# Patient Record
Sex: FEMALE | Race: WHITE | HISPANIC OR LATINO | Employment: UNEMPLOYED | URBAN - METROPOLITAN AREA
[De-identification: names, ages, dates, MRNs, and addresses within clinical notes are randomized per-mention and may not be internally consistent; named-entity substitution may affect disease eponyms.]

---

## 2019-04-05 ENCOUNTER — NON-PROVIDER VISIT (OUTPATIENT)
Dept: OBGYN | Facility: CLINIC | Age: 34
End: 2019-04-05
Payer: MEDICAID

## 2019-04-05 DIAGNOSIS — Z32.01 POSITIVE URINE PREGNANCY TEST: ICD-10-CM

## 2019-04-05 LAB
INT CON NEG: NEGATIVE
INT CON POS: POSITIVE
POC URINE PREGNANCY TEST: POSITIVE

## 2019-04-05 PROCEDURE — 81025 URINE PREGNANCY TEST: CPT | Performed by: OBSTETRICS & GYNECOLOGY

## 2019-04-18 ENCOUNTER — HOSPITAL ENCOUNTER (OUTPATIENT)
Dept: LAB | Facility: MEDICAL CENTER | Age: 34
End: 2019-04-18
Attending: SPECIALIST
Payer: MEDICAID

## 2019-04-19 LAB
FORWARD REASON: SPWHY: NORMAL
FORWARDED TO LAB: SPWHR: NORMAL
SPECIMEN SENT: SPWT1: NORMAL
SPECIMEN SOURCE: NORMAL
SPECIMEN SOURCE: NORMAL

## 2019-10-31 NOTE — H&P
DATE OF ADMISSION:  2019    REASON FOR ADMISSION:  Prodromal labor.    HISTORY OF PRESENT ILLNESS:  This is a 34-year-old  4, para 3 at 39   weeks gestation based on last menstrual period consistent with a 9-week   ultrasound, who now wished to proceed forward with an augmentation of labor   with Pitocin per protocol.  She is 2, 60%, -2 station, soft, anterior.  She   has a history of rapid deliveries ranging 4-5 hours.  She is group B strep   positive.  Given her prodromal labor symptoms, she is wishing to proceed   forward with an augmentation, so that she may receive at least 2 doses of   antibiotics prior to delivery and is comfortable proceeding forward  given her   favorable cervix and favorable Doty score.    PAST MEDICAL HISTORY:  Occasional migraine headaches.    PAST SURGICAL HISTORY:  None.    OBSTETRICAL HISTORY:  The patient has had 3 previous deliveries delivering   somewhere in the neighborhood of 4-5 hours with each delivery between 38 and   41 weeks' gestation.  This is her fourth pregnancy.    SOCIAL HISTORY:  She denies use of any alcohol, tobacco, or recreational drug   use.    MEDICATIONS:  Prenatal vitamins.    ALLERGIES:  No known drug allergies.    PHYSICAL EXAMINATION:  VITAL SIGNS:  She is afebrile, hemodynamically stable.  HEART:  Regular rate and rhythm.  CHEST:  Clear to auscultation bilaterally.  ABDOMEN:  Soft, gravid, nontender.  PELVIC:  Sterile vaginal exam is as stated above.  EXTREMITIES:  Nontender.    LABORATORY DATA:  Prenatal care labs are all in order.  She is group B strep   positive.    ASSESSMENT AND PLAN:  A 34-year-old  4, para 3 at 39 weeks gestation by   excellent dates with prodromal labor symptoms, favorable cervix, favorable   Doty score, history of fast labors, group B strep positive wishing to   attempt to try to receive 2 doses of antibiotics prior to delivery, now   wishing to proceed forward with Pitocin augmentation of labor.              ____________________________________     MD GERALD BEVERLY    DD:  10/31/2019 13:40:54  DT:  10/31/2019 14:07:43    D#:  [id number]  Job#:  [id number]

## 2019-11-13 ENCOUNTER — HOSPITAL ENCOUNTER (INPATIENT)
Facility: MEDICAL CENTER | Age: 34
LOS: 2 days | End: 2019-11-15
Attending: SPECIALIST | Admitting: SPECIALIST
Payer: MEDICAID

## 2019-11-13 ENCOUNTER — APPOINTMENT (OUTPATIENT)
Dept: OBGYN | Facility: MEDICAL CENTER | Age: 34
End: 2019-11-13
Attending: SPECIALIST
Payer: MEDICAID

## 2019-11-13 DIAGNOSIS — R10.2 FEMALE PELVIC PAIN: ICD-10-CM

## 2019-11-13 LAB
BASOPHILS # BLD AUTO: 0.4 % (ref 0–1.8)
BASOPHILS # BLD: 0.04 K/UL (ref 0–0.12)
EOSINOPHIL # BLD AUTO: 0.15 K/UL (ref 0–0.51)
EOSINOPHIL NFR BLD: 1.4 % (ref 0–6.9)
ERYTHROCYTE [DISTWIDTH] IN BLOOD BY AUTOMATED COUNT: 42.6 FL (ref 35.9–50)
HCT VFR BLD AUTO: 38.6 % (ref 37–47)
HGB BLD-MCNC: 12.7 G/DL (ref 12–16)
HOLDING TUBE BB 8507: NORMAL
IMM GRANULOCYTES # BLD AUTO: 0.03 K/UL (ref 0–0.11)
IMM GRANULOCYTES NFR BLD AUTO: 0.3 % (ref 0–0.9)
LYMPHOCYTES # BLD AUTO: 3.34 K/UL (ref 1–4.8)
LYMPHOCYTES NFR BLD: 31.7 % (ref 22–41)
MCH RBC QN AUTO: 29 PG (ref 27–33)
MCHC RBC AUTO-ENTMCNC: 32.9 G/DL (ref 33.6–35)
MCV RBC AUTO: 88.1 FL (ref 81.4–97.8)
MONOCYTES # BLD AUTO: 0.63 K/UL (ref 0–0.85)
MONOCYTES NFR BLD AUTO: 6 % (ref 0–13.4)
NEUTROPHILS # BLD AUTO: 6.34 K/UL (ref 2–7.15)
NEUTROPHILS NFR BLD: 60.2 % (ref 44–72)
NRBC # BLD AUTO: 0 K/UL
NRBC BLD-RTO: 0 /100 WBC
PLATELET # BLD AUTO: 233 K/UL (ref 164–446)
PMV BLD AUTO: 9.8 FL (ref 9–12.9)
RBC # BLD AUTO: 4.38 M/UL (ref 4.2–5.4)
WBC # BLD AUTO: 10.5 K/UL (ref 4.8–10.8)

## 2019-11-13 PROCEDURE — 770002 HCHG ROOM/CARE - OB PRIVATE (112)

## 2019-11-13 PROCEDURE — 304965 HCHG RECOVERY SERVICES

## 2019-11-13 PROCEDURE — 59409 OBSTETRICAL CARE: CPT

## 2019-11-13 PROCEDURE — 700111 HCHG RX REV CODE 636 W/ 250 OVERRIDE (IP)

## 2019-11-13 PROCEDURE — 85025 COMPLETE CBC W/AUTO DIFF WBC: CPT

## 2019-11-13 PROCEDURE — 10907ZC DRAINAGE OF AMNIOTIC FLUID, THERAPEUTIC FROM PRODUCTS OF CONCEPTION, VIA NATURAL OR ARTIFICIAL OPENING: ICD-10-PCS | Performed by: SPECIALIST

## 2019-11-13 PROCEDURE — 700105 HCHG RX REV CODE 258

## 2019-11-13 PROCEDURE — 700102 HCHG RX REV CODE 250 W/ 637 OVERRIDE(OP): Performed by: SPECIALIST

## 2019-11-13 PROCEDURE — 36415 COLL VENOUS BLD VENIPUNCTURE: CPT

## 2019-11-13 PROCEDURE — 700105 HCHG RX REV CODE 258: Performed by: SPECIALIST

## 2019-11-13 PROCEDURE — A9270 NON-COVERED ITEM OR SERVICE: HCPCS | Performed by: SPECIALIST

## 2019-11-13 PROCEDURE — 10H07YZ INSERTION OF OTHER DEVICE INTO PRODUCTS OF CONCEPTION, VIA NATURAL OR ARTIFICIAL OPENING: ICD-10-PCS | Performed by: SPECIALIST

## 2019-11-13 PROCEDURE — 700111 HCHG RX REV CODE 636 W/ 250 OVERRIDE (IP): Performed by: SPECIALIST

## 2019-11-13 RX ORDER — DOCUSATE SODIUM 100 MG/1
100 CAPSULE, LIQUID FILLED ORAL 2 TIMES DAILY PRN
Status: DISCONTINUED | OUTPATIENT
Start: 2019-11-13 | End: 2019-11-15 | Stop reason: HOSPADM

## 2019-11-13 RX ORDER — HYDROCODONE BITARTRATE AND ACETAMINOPHEN 5; 325 MG/1; MG/1
1 TABLET ORAL EVERY 4 HOURS PRN
Status: DISCONTINUED | OUTPATIENT
Start: 2019-11-13 | End: 2019-11-15 | Stop reason: HOSPADM

## 2019-11-13 RX ORDER — VITAMIN A ACETATE, BETA CAROTENE, ASCORBIC ACID, CHOLECALCIFEROL, .ALPHA.-TOCOPHEROL ACETATE, DL-, THIAMINE MONONITRATE, RIBOFLAVIN, NIACINAMIDE, PYRIDOXINE HYDROCHLORIDE, FOLIC ACID, CYANOCOBALAMIN, CALCIUM CARBONATE, FERROUS FUMARATE, ZINC OXIDE, CUPRIC OXIDE 3080; 12; 120; 400; 1; 1.84; 3; 20; 22; 920; 25; 200; 27; 10; 2 [IU]/1; UG/1; MG/1; [IU]/1; MG/1; MG/1; MG/1; MG/1; MG/1; [IU]/1; MG/1; MG/1; MG/1; MG/1; MG/1
1 TABLET, FILM COATED ORAL EVERY MORNING
Status: DISCONTINUED | OUTPATIENT
Start: 2019-11-14 | End: 2019-11-15 | Stop reason: HOSPADM

## 2019-11-13 RX ORDER — ONDANSETRON 2 MG/ML
4 INJECTION INTRAMUSCULAR; INTRAVENOUS EVERY 6 HOURS PRN
Status: DISCONTINUED | OUTPATIENT
Start: 2019-11-13 | End: 2019-11-15 | Stop reason: HOSPADM

## 2019-11-13 RX ORDER — BISACODYL 10 MG
10 SUPPOSITORY, RECTAL RECTAL PRN
Status: DISCONTINUED | OUTPATIENT
Start: 2019-11-13 | End: 2019-11-15 | Stop reason: HOSPADM

## 2019-11-13 RX ORDER — TERBUTALINE SULFATE 1 MG/ML
0.25 INJECTION, SOLUTION SUBCUTANEOUS PRN
Status: DISCONTINUED | OUTPATIENT
Start: 2019-11-13 | End: 2019-11-13

## 2019-11-13 RX ORDER — SODIUM CHLORIDE 9 MG/ML
INJECTION, SOLUTION INTRAVENOUS
Status: ACTIVE
Start: 2019-11-13 | End: 2019-11-13

## 2019-11-13 RX ORDER — MISOPROSTOL 200 UG/1
600 TABLET ORAL
Status: DISCONTINUED | OUTPATIENT
Start: 2019-11-13 | End: 2019-11-15 | Stop reason: HOSPADM

## 2019-11-13 RX ORDER — SODIUM CHLORIDE, SODIUM LACTATE, POTASSIUM CHLORIDE, CALCIUM CHLORIDE 600; 310; 30; 20 MG/100ML; MG/100ML; MG/100ML; MG/100ML
1000 INJECTION, SOLUTION INTRAVENOUS CONTINUOUS
Status: DISCONTINUED | OUTPATIENT
Start: 2019-11-13 | End: 2019-11-13

## 2019-11-13 RX ORDER — IBUPROFEN 600 MG/1
600 TABLET ORAL EVERY 6 HOURS PRN
Status: DISCONTINUED | OUTPATIENT
Start: 2019-11-13 | End: 2019-11-15 | Stop reason: HOSPADM

## 2019-11-13 RX ORDER — ONDANSETRON 4 MG/1
4 TABLET, ORALLY DISINTEGRATING ORAL EVERY 6 HOURS PRN
Status: DISCONTINUED | OUTPATIENT
Start: 2019-11-13 | End: 2019-11-15 | Stop reason: HOSPADM

## 2019-11-13 RX ORDER — CITRIC ACID/SODIUM CITRATE 334-500MG
30 SOLUTION, ORAL ORAL EVERY 6 HOURS PRN
Status: DISCONTINUED | OUTPATIENT
Start: 2019-11-13 | End: 2019-11-13

## 2019-11-13 RX ORDER — ACETAMINOPHEN 325 MG/1
325 TABLET ORAL EVERY 4 HOURS PRN
Status: DISCONTINUED | OUTPATIENT
Start: 2019-11-13 | End: 2019-11-15 | Stop reason: HOSPADM

## 2019-11-13 RX ORDER — MISOPROSTOL 200 UG/1
800 TABLET ORAL
Status: COMPLETED | OUTPATIENT
Start: 2019-11-13 | End: 2019-11-13

## 2019-11-13 RX ORDER — HYDROXYZINE 50 MG/1
50 TABLET, FILM COATED ORAL EVERY 6 HOURS PRN
Status: DISCONTINUED | OUTPATIENT
Start: 2019-11-13 | End: 2019-11-13

## 2019-11-13 RX ORDER — PENICILLIN G POTASSIUM 5000000 [IU]/1
INJECTION, POWDER, FOR SOLUTION INTRAMUSCULAR; INTRAVENOUS
Status: ACTIVE
Start: 2019-11-13 | End: 2019-11-13

## 2019-11-13 RX ORDER — SODIUM CHLORIDE, SODIUM LACTATE, POTASSIUM CHLORIDE, CALCIUM CHLORIDE 600; 310; 30; 20 MG/100ML; MG/100ML; MG/100ML; MG/100ML
INJECTION, SOLUTION INTRAVENOUS
Status: COMPLETED
Start: 2019-11-13 | End: 2019-11-13

## 2019-11-13 RX ORDER — SODIUM CHLORIDE, SODIUM LACTATE, POTASSIUM CHLORIDE, CALCIUM CHLORIDE 600; 310; 30; 20 MG/100ML; MG/100ML; MG/100ML; MG/100ML
INJECTION, SOLUTION INTRAVENOUS PRN
Status: DISCONTINUED | OUTPATIENT
Start: 2019-11-13 | End: 2019-11-15 | Stop reason: HOSPADM

## 2019-11-13 RX ORDER — SODIUM CHLORIDE, SODIUM LACTATE, POTASSIUM CHLORIDE, CALCIUM CHLORIDE 600; 310; 30; 20 MG/100ML; MG/100ML; MG/100ML; MG/100ML
INJECTION, SOLUTION INTRAVENOUS CONTINUOUS
Status: DISPENSED | OUTPATIENT
Start: 2019-11-13 | End: 2019-11-13

## 2019-11-13 RX ORDER — OXYCODONE HYDROCHLORIDE AND ACETAMINOPHEN 5; 325 MG/1; MG/1
2 TABLET ORAL EVERY 4 HOURS PRN
Status: DISCONTINUED | OUTPATIENT
Start: 2019-11-13 | End: 2019-11-15 | Stop reason: HOSPADM

## 2019-11-13 RX ADMIN — Medication 2000 ML/HR: at 21:56

## 2019-11-13 RX ADMIN — ONDANSETRON 4 MG: 2 INJECTION INTRAMUSCULAR; INTRAVENOUS at 19:47

## 2019-11-13 RX ADMIN — SODIUM CHLORIDE 2.5 MILLION UNITS: 9 INJECTION, SOLUTION INTRAVENOUS at 09:53

## 2019-11-13 RX ADMIN — SODIUM CHLORIDE, POTASSIUM CHLORIDE, SODIUM LACTATE AND CALCIUM CHLORIDE: 600; 310; 30; 20 INJECTION, SOLUTION INTRAVENOUS at 06:15

## 2019-11-13 RX ADMIN — Medication 2 MILLI-UNITS/MIN: at 06:15

## 2019-11-13 RX ADMIN — FENTANYL CITRATE 100 MCG: 50 INJECTION, SOLUTION INTRAMUSCULAR; INTRAVENOUS at 19:48

## 2019-11-13 RX ADMIN — IBUPROFEN 600 MG: 600 TABLET ORAL at 22:55

## 2019-11-13 RX ADMIN — SODIUM CHLORIDE 2.5 MILLION UNITS: 9 INJECTION, SOLUTION INTRAVENOUS at 19:21

## 2019-11-13 RX ADMIN — HYDROCODONE BITARTRATE AND ACETAMINOPHEN 1 TABLET: 5; 325 TABLET ORAL at 22:56

## 2019-11-13 RX ADMIN — Medication 125 ML/HR: at 22:59

## 2019-11-13 RX ADMIN — SODIUM CHLORIDE 2.5 MILLION UNITS: 9 INJECTION, SOLUTION INTRAVENOUS at 15:45

## 2019-11-13 RX ADMIN — SODIUM CHLORIDE, POTASSIUM CHLORIDE, SODIUM LACTATE AND CALCIUM CHLORIDE 1000 ML: 600; 310; 30; 20 INJECTION, SOLUTION INTRAVENOUS at 19:19

## 2019-11-13 RX ADMIN — SODIUM CHLORIDE 5 MILLION UNITS: 900 INJECTION INTRAVENOUS at 06:14

## 2019-11-13 RX ADMIN — SODIUM CHLORIDE, SODIUM LACTATE, POTASSIUM CHLORIDE, CALCIUM CHLORIDE: 600; 310; 30; 20 INJECTION, SOLUTION INTRAVENOUS at 06:15

## 2019-11-13 ASSESSMENT — PATIENT HEALTH QUESTIONNAIRE - PHQ9
1. LITTLE INTEREST OR PLEASURE IN DOING THINGS: NOT AT ALL
2. FEELING DOWN, DEPRESSED, IRRITABLE, OR HOPELESS: NOT AT ALL
SUM OF ALL RESPONSES TO PHQ9 QUESTIONS 1 AND 2: 0
1. LITTLE INTEREST OR PLEASURE IN DOING THINGS: NOT AT ALL
SUM OF ALL RESPONSES TO PHQ9 QUESTIONS 1 AND 2: 0
2. FEELING DOWN, DEPRESSED, IRRITABLE, OR HOPELESS: NOT AT ALL

## 2019-11-13 ASSESSMENT — LIFESTYLE VARIABLES
ALCOHOL_USE: NO
EVER_SMOKED: NEVER

## 2019-11-13 NOTE — PROGRESS NOTES
0518- 33 y/o , EDC , EGA 39.0. Pt here for elective IOL d/t GBS status and hx of  fast deliveries with no complaints. Pt reports +FM, denies lof/bleeding. Pt placed on monitors.     0600- SVE as noted.     0615- Pitocin and ABX started per MD orders, see MAR.     645- Call placed to Dr. Zamarripa to update on pt's cerival exam. Orders received to stop pitocin and restart at 1000 when pt gets 2nd dose of ABX.     0700- Bedside report given to AILYN Santo. POC discussed.

## 2019-11-13 NOTE — PROGRESS NOTES
"Progress Note    Subjective:   Doing well. Comfortable with uterine contractions. Reports good fetal movement.    Objective Data:  Recent Labs     19  0545   WBC 10.5   RBC 4.38   HEMOGLOBIN 12.7   HEMATOCRIT 38.6   MCV 88.1   MCH 29.0   MCHC 32.9*   RDW 42.6   PLATELETCT 233   MPV 9.8           Vitals:    19 0520 19 0530 19 0624   BP: 116/63     Pulse: (!) 102     Resp:   16   Temp: 36.2 °C (97.2 °F)     TempSrc: Temporal     Weight:  73.9 kg (163 lb)    Height:  1.499 m (4' 11\")      Abdomen: soft gravid non tender  SVE: Deferred  Ext:non tender calves    No intake or output data in the 24 hours ending 19 0752    Current Facility-Administered Medications   Medication Dose Route Frequency Provider Last Rate Last Dose   • LR infusion   Intravenous Continuous William Zamarripa M.D. 125 mL/hr at 19 0615     • fentaNYL (SUBLIMAZE) injection 50 mcg  50 mcg Intravenous Q HOUR PRN William Zamarripa M.D.       • fentaNYL (SUBLIMAZE) injection 100 mcg  100 mcg Intravenous Q HOUR PRN William Zamarripa M.D.       • terbutaline (BRETHINE) injection 0.25 mg  0.25 mg Intravenous PRN William Zamarripa M.D.       • hydrOXYzine HCl (ATARAX) tablet 50 mg  50 mg Oral Q6HRS PRN William Zamarripa M.D.       • Na citrate-citric acid (BICITRA) 500-334 MG/5ML solution 30 mL  30 mL Oral Q6HRS PRN William Zamarripa M.D.       • penicillin G potassium 2.5 Million Units in  mL IVPB  2.5 Million Units Intravenous Q4HRS William Zamarripa M.D.       • miSOPROStol (CYTOTEC) tablet 800 mcg  800 mcg Rectal Once PRN William Zamarripa M.D.       • oxytocin (PITOCIN) infusion (for induction)  0.5-20 tara-units/min Intravenous Continuous William Zamarripa M.D.   Stopped at 19 0646   • PENICILLIN G POTASSIUM [id number] UNITS INJ SOLR            • SODIUM CHLORIDE 0.9 % IV SOLN                A/P 33 yo  at term with advanced cervical change and GBS positive. Will defer starting Pitocin augmentation at this time " until second dose of antibiotic can be given. All questions were answered.

## 2019-11-13 NOTE — PROGRESS NOTES
"Progress Note    Subjective:   Doing well. No issues or concerns at this time. Had nausea last pm. Feeling better now. No sig bleeding or discharge. Pain well controlled.    Objective Data:  Recent Labs     11/13/19  0545   WBC 10.5   RBC 4.38   HEMOGLOBIN 12.7   HEMATOCRIT 38.6   MCV 88.1   MCH 29.0   MCHC 32.9*   RDW 42.6   PLATELETCT 233   MPV 9.8           Vitals:    11/13/19 0520 11/13/19 0530 11/13/19 0624   BP: 116/63     Pulse: (!) 102     Resp:   16   Temp: 36.2 °C (97.2 °F)     TempSrc: Temporal     Weight:  73.9 kg (163 lb)    Height:  1.499 m (4' 11\")      Abdomen: soft non tender fundus at umbilicus with 1/4 ttp at covered incision  Perineum: no sig bleeding or discharge  Ext: non tender calves    No intake or output data in the 24 hours ending 11/13/19 0749    Current Facility-Administered Medications   Medication Dose Route Frequency Provider Last Rate Last Dose   • LR infusion   Intravenous Continuous William Zamarripa M.D. 125 mL/hr at 11/13/19 0615     • fentaNYL (SUBLIMAZE) injection 50 mcg  50 mcg Intravenous Q HOUR PRN William Zamarripa M.D.       • fentaNYL (SUBLIMAZE) injection 100 mcg  100 mcg Intravenous Q HOUR PRN William Zamarripa M.D.       • terbutaline (BRETHINE) injection 0.25 mg  0.25 mg Intravenous PRN William Zamarripa M.D.       • hydrOXYzine HCl (ATARAX) tablet 50 mg  50 mg Oral Q6HRS PRN William Zamarripa M.D.       • Na citrate-citric acid (BICITRA) 500-334 MG/5ML solution 30 mL  30 mL Oral Q6HRS PRN William Zamarripa M.D.       • penicillin G potassium 2.5 Million Units in  mL IVPB  2.5 Million Units Intravenous Q4HRS William Zamarripa M.D.       • miSOPROStol (CYTOTEC) tablet 800 mcg  800 mcg Rectal Once PRN William Zamarripa M.D.       • oxytocin (PITOCIN) infusion (for induction)  0.5-20 tara-units/min Intravenous Continuous William Zamarripa M.D.   Stopped at 11/13/19 0646   • PENICILLIN G POTASSIUM [id number] UNITS INJ SOLR            • SODIUM CHLORIDE 0.9 % IV SOLN          "       A/P S/P Repeat LTCS. Doing well. No issues or concerns and will proceed with the usual pp and pp management today.

## 2019-11-13 NOTE — PROGRESS NOTES
1130: Assumed care of pt. AAO, pt denies pain with UC. POC discussed, pt verbalized understanding. FOB at bedside.

## 2019-11-14 LAB
ERYTHROCYTE [DISTWIDTH] IN BLOOD BY AUTOMATED COUNT: 42.7 FL (ref 35.9–50)
HCT VFR BLD AUTO: 35 % (ref 37–47)
HGB BLD-MCNC: 11.7 G/DL (ref 12–16)
MCH RBC QN AUTO: 29.1 PG (ref 27–33)
MCHC RBC AUTO-ENTMCNC: 33.4 G/DL (ref 33.6–35)
MCV RBC AUTO: 87.1 FL (ref 81.4–97.8)
PLATELET # BLD AUTO: 213 K/UL (ref 164–446)
PMV BLD AUTO: 9.6 FL (ref 9–12.9)
RBC # BLD AUTO: 4.02 M/UL (ref 4.2–5.4)
WBC # BLD AUTO: 15.4 K/UL (ref 4.8–10.8)

## 2019-11-14 PROCEDURE — 36415 COLL VENOUS BLD VENIPUNCTURE: CPT

## 2019-11-14 PROCEDURE — 700111 HCHG RX REV CODE 636 W/ 250 OVERRIDE (IP): Performed by: SPECIALIST

## 2019-11-14 PROCEDURE — 85027 COMPLETE CBC AUTOMATED: CPT

## 2019-11-14 PROCEDURE — A9270 NON-COVERED ITEM OR SERVICE: HCPCS | Performed by: SPECIALIST

## 2019-11-14 PROCEDURE — 3E02340 INTRODUCTION OF INFLUENZA VACCINE INTO MUSCLE, PERCUTANEOUS APPROACH: ICD-10-PCS | Performed by: SPECIALIST

## 2019-11-14 PROCEDURE — 700102 HCHG RX REV CODE 250 W/ 637 OVERRIDE(OP): Performed by: SPECIALIST

## 2019-11-14 PROCEDURE — 770002 HCHG ROOM/CARE - OB PRIVATE (112)

## 2019-11-14 PROCEDURE — 90471 IMMUNIZATION ADMIN: CPT

## 2019-11-14 PROCEDURE — 90686 IIV4 VACC NO PRSV 0.5 ML IM: CPT | Performed by: SPECIALIST

## 2019-11-14 RX ADMIN — HYDROCODONE BITARTRATE AND ACETAMINOPHEN 1 TABLET: 5; 325 TABLET ORAL at 05:59

## 2019-11-14 RX ADMIN — VITAMIN A, VITAMIN C, VITAMIN D-3, VITAMIN E, VITAMIN B-1, VITAMIN B-2, NIACIN, VITAMIN B-6, CALCIUM, IRON, ZINC, COPPER 1 TABLET: 4000; 120; 400; 22; 1.84; 3; 20; 10; 1; 12; 200; 27; 25; 2 TABLET ORAL at 05:42

## 2019-11-14 RX ADMIN — HYDROCODONE BITARTRATE AND ACETAMINOPHEN 1 TABLET: 5; 325 TABLET ORAL at 20:53

## 2019-11-14 RX ADMIN — INFLUENZA A VIRUS A/BRISBANE/02/2018 IVR-190 (H1N1) ANTIGEN (FORMALDEHYDE INACTIVATED), INFLUENZA A VIRUS A/KANSAS/14/2017 X-327 (H3N2) ANTIGEN (FORMALDEHYDE INACTIVATED), INFLUENZA B VIRUS B/PHUKET/3073/2013 ANTIGEN (FORMALDEHYDE INACTIVATED), AND INFLUENZA B VIRUS B/MARYLAND/15/2016 BX-69A ANTIGEN (FORMALDEHYDE INACTIVATED) 0.5 ML: 15; 15; 15; 15 INJECTION, SUSPENSION INTRAMUSCULAR at 23:34

## 2019-11-14 RX ADMIN — IBUPROFEN 600 MG: 600 TABLET ORAL at 17:43

## 2019-11-14 RX ADMIN — IBUPROFEN 600 MG: 600 TABLET ORAL at 05:42

## 2019-11-14 ASSESSMENT — EDINBURGH POSTNATAL DEPRESSION SCALE (EPDS)
I HAVE BEEN ABLE TO LAUGH AND SEE THE FUNNY SIDE OF THINGS: AS MUCH AS I ALWAYS COULD
I HAVE BEEN SO UNHAPPY THAT I HAVE BEEN CRYING: NO, NEVER
I HAVE FELT SCARED OR PANICKY FOR NO GOOD REASON: NO, NOT AT ALL
I HAVE BLAMED MYSELF UNNECESSARILY WHEN THINGS WENT WRONG: NOT VERY OFTEN
I HAVE BEEN SO UNHAPPY THAT I HAVE HAD DIFFICULTY SLEEPING: NOT AT ALL
I HAVE LOOKED FORWARD WITH ENJOYMENT TO THINGS: AS MUCH AS I EVER DID
I HAVE BEEN ANXIOUS OR WORRIED FOR NO GOOD REASON: NO, NOT AT ALL
I HAVE FELT SAD OR MISERABLE: NO, NOT AT ALL
THINGS HAVE BEEN GETTING ON TOP OF ME: NO, MOST OF THE TIME I HAVE COPED QUITE WELL
THE THOUGHT OF HARMING MYSELF HAS OCCURRED TO ME: NEVER

## 2019-11-14 NOTE — CARE PLAN
Patient has scant to light lochia with a firm palpable uterus.  Vital signs are within defined limits.  Assessment will continue.     Patient will ask for pain medication when needed.  Pain assessment will continue.

## 2019-11-14 NOTE — PROGRESS NOTES
190 Report received, pt care assumed.   /-2   Dr. Zamarripa at bedside, IUPC out per pt. Dr. Zamarripa replaced IUPC. SVE 7cm swollen cervix.   /0 pt feeling urge to push. Dr. Zamarripa notified.    Dr. Zamarripa at bedside   SVE 10/100 Pushing  2140 IV out per pt.     viable male, compound Left arm   New IV started left hand, pitocin bolus started.    Placenta delivered.  2315 Pt up to bathroom. Steady gait. +void. Pt taught how to use greg bottle, dermoplast spray, and tucks pads, pt demonstrated understanding. Greg care done. New greg pad and gown to pt.   2325 Pt transferred to PP room 338 via wheelchair and assisted into PP bed  2330 Report to AILYN [place], PP.

## 2019-11-14 NOTE — OR SURGEON
Immediate Delivery Note        Estimated Blood Loss: 200ccs    Findings:  over intact perineum without any nuchal cord with easy delivery of the shoulders and body with Apgars of 3/8 at one and five minutes respectively with placenta delivered spontaneous and intact with 3vc. No lacerations for repair.    Complications: None        2019 10:04 PM William Zamarripa M.D.

## 2019-11-14 NOTE — DISCHARGE SUMMARY
DATE OF ADMISSION:  2019.    DATE OF DISCHARGE:  2019.    DISCHARGE DIAGNOSES:  1.  Status post spontaneous vaginal delivery.  2.  Uncomplicated postpartum course.    HISTORY OF PRESENT ILLNESS:  A 34-year-old  4, para 3 at 39 weeks   gestation by excellent dates with a favorable cervix, who presented to labor   and delivery with complaints of frequent regular painful uterine contractions.    PAST MEDICAL HISTORY AND PHYSICAL EXAMINATION:  Can be found in dictated   history and physical.    ASSESSMENT AND PLAN:  A 34-year-old  4, para 3, at term who actually   presented with advanced cervical change at the time of admission, was found to   actually be 5 cm dilated with a history of group B positivity and thus a   delay of starting Pitocin augmentation was performed so that the patient could   receive a second dose of antibiotic.    HOSPITAL COURSE:  As stated above, the patient was admitted.  As stated above,   the Pitocin augmentation was delayed, so that she was able to receive a   second dose of antibiotic, at which time Pitocin augmentation was started,   increased per protocol.  She did have artificial rupture of membranes   performed, progressed well, had moderate variable decelerations during the   pushing phase of labor with subsequent delivery with mild-to-moderate meconium   noted.  The Apgars were 3 and 8 at 1 and 5 minutes respectively.  Her   postpartum course was unremarkable and she was ambulating and voiding well,   tolerating a regular diet.  Her pain was well controlled.  She is   breastfeeding well.  She was felt to be appropriate for discharge.    DISCHARGE PLAN:  To follow up in 6 weeks.    DISCHARGE INSTRUCTIONS:  She is to call with any increased temperature greater   than 100.4, increasing vaginal bleeding, abdominal pain unrelieved with any   p.o. pain medication.  Call with any other questions or concerns.       ____________________________________     CESAR GRACIA  MD GERALD NERI / SUNNY    DD:  11/14/2019 08:04:35  DT:  11/14/2019 08:20:36    D#:  [id number]  Job#:  [id number]

## 2019-11-14 NOTE — PROGRESS NOTES
"Progress Note    Subjective:   Doing well. Comfortable without any pain medication. Feeling frequent uterine contractions.    Objective Data:  Recent Labs     11/13/19  0545   WBC 10.5   RBC 4.38   HEMOGLOBIN 12.7   HEMATOCRIT 38.6   MCV 88.1   MCH 29.0   MCHC 32.9*   RDW 42.6   PLATELETCT 233   MPV 9.8           Vitals:    11/13/19 0520 11/13/19 0530 11/13/19 0624 11/13/19 1657   BP: 116/63   108/65   Pulse: (!) 102   100   Resp:   16    Temp: 36.2 °C (97.2 °F)      TempSrc: Temporal      Weight:  73.9 kg (163 lb)     Height:  1.499 m (4' 11\")       Abdomen: soft gravid non tender  SVE: 5cm/70%/-2  Vtx/Arom clear fluid  Ext:non tender calves    FHTs: 140s with GBTBV  [place]: q 1-2 min    No intake or output data in the 24 hours ending 11/13/19 1716    Current Facility-Administered Medications   Medication Dose Route Frequency Provider Last Rate Last Dose   • fentaNYL (SUBLIMAZE) injection 50 mcg  50 mcg Intravenous Q HOUR PRN William Zamarripa M.D.       • fentaNYL (SUBLIMAZE) injection 100 mcg  100 mcg Intravenous Q HOUR PRN William Zamarripa M.D.       • terbutaline (BRETHINE) injection 0.25 mg  0.25 mg Intravenous PRN William Zamarripa M.D.       • hydrOXYzine HCl (ATARAX) tablet 50 mg  50 mg Oral Q6HRS PRN William Zamarripa M.D.       • Na citrate-citric acid (BICITRA) 500-334 MG/5ML solution 30 mL  30 mL Oral Q6HRS PRN William Zamarripa M.D.       • penicillin G potassium 2.5 Million Units in  mL IVPB  2.5 Million Units Intravenous Q4HRS William Zamarripa M.D. 200 mL/hr at 11/13/19 1545 2.5 Million Units at 11/13/19 1545   • miSOPROStol (CYTOTEC) tablet 800 mcg  800 mcg Rectal Once PRN William Zamarripa M.D.       • oxytocin (PITOCIN) infusion (for induction)  0.5-20 tara-units/min Intravenous Continuous William Zamarripa M.D. 24 mL/hr at 11/13/19 1225 8 tara-units/min at 11/13/19 1225   • PENICILLIN G POTASSIUM [id number] UNITS INJ SOLR            • SODIUM CHLORIDE 0.9 % IV SOLN                A/P 35 yo at term " undergoing an induction of labor with Pitocin with history of fast labors now s/p two doses of antibiotic. Proceed with the Pitocin per protocol. Reassuring fetal status.

## 2019-11-14 NOTE — PROGRESS NOTES
Progress Note    Subjective:   Doing well. No issues or concerns. Pain well controlled. No sig bleeding or discharge.    Objective Data:  Recent Labs     11/13/19  0545 11/14/19  0600   WBC 10.5 15.4*   RBC 4.38 4.02*   HEMOGLOBIN 12.7 11.7*   HEMATOCRIT 38.6 35.0*   MCV 88.1 87.1   MCH 29.0 29.1   MCHC 32.9* 33.4*   RDW 42.6 42.7   PLATELETCT 233 213   MPV 9.8 9.6           Vitals:    11/13/19 2257 11/13/19 2340 11/14/19 0200 11/14/19 0600   BP: 121/56 104/61 114/65 108/64   Pulse: (!) 110 (!) 102 93 80   Resp:  17 18 17   Temp:  37.1 °C (98.8 °F) 36.1 °C (97 °F) 36.6 °C (97.8 °F)   TempSrc:  Temporal Temporal Temporal   SpO2:  100% 97% 94%   Weight:       Height:         Abdomen: soft non tender fundus at umbilicus  Perinuem: No sig bleeding or discharge  Ext:non tender calves    Intake/Output Summary (Last 24 hours) at 11/14/2019 0800  Last data filed at 11/13/2019 2147  Gross per 24 hour   Intake --   Output 200 ml   Net -200 ml       Current Facility-Administered Medications   Medication Dose Route Frequency Provider Last Rate Last Dose   • ondansetron (ZOFRAN ODT) dispertab 4 mg  4 mg Oral Q6HRS PRN William Zamarripa M.D.        Or   • ondansetron (ZOFRAN) syringe/vial injection 4 mg  4 mg Intravenous Q6HRS PRN William Zamarripa M.D.   4 mg at 11/13/19 1947   • oxytocin (PITOCIN) infusion (for postpartum)   mL/hr Intravenous Continuous William Zamarripa M.D. 125 mL/hr at 11/13/19 2259 125 mL/hr at 11/13/19 2259   • ibuprofen (MOTRIN) tablet 600 mg  600 mg Oral Q6HRS PRN William Zamarripa M.D.   600 mg at 11/14/19 0542   • acetaminophen (TYLENOL) tablet 325 mg  325 mg Oral Q4HRS PRN William Zamarripa M.D.       • HYDROcodone-acetaminophen (NORCO) 5-325 MG per tablet 1 Tab  1 Tab Oral Q4HRS PRN William Zamarripa M.D.   1 Tab at 11/14/19 0559   • oxyCODONE-acetaminophen (PERCOCET) 5-325 MG per tablet 2 Tab  2 Tab Oral Q4HRS PRARSH Zamarripa M.D.       • LR infusion   Intravenous PRN William Zamarripa M.D.        • miSOPROStol (CYTOTEC) tablet 600 mcg  600 mcg Rectal Once PRN William Zamarripa M.D.       • docusate sodium (COLACE) capsule 100 mg  100 mg Oral BID PRN William Zamarripa M.D.       • bisacodyl (DULCOLAX) suppository 10 mg  10 mg Rectal PRN William Zamarripa M.D.       • prenatal plus vitamin (STUARTNATAL 1+1) 27-1 MG tablet 1 Tab  1 Tab Oral QAM William Zamarripa M.D.   1 Tab at 19 0542       A/P S/P . Doing well. No issues or concerns. Pain well controlled. Proceed with the usual pp management and will discharge home when meets discharge criteria.

## 2019-11-14 NOTE — L&D DELIVERY NOTE
DATE OF SERVICE:  2019    REASON FOR ADMISSION:  Augmentation of labor with Pitocin per protocol.    HISTORY OF PRESENT ILLNESS:  This is a 34-year-old  4, para 3, at 39   weeks gestation, who presents to labor and delivery.  She does have a history   of fast labor.  She is group B strep positive and was started on antibiotics   per the group B strep protocol.  The patient was nearly 5 cm at the time of   presentation and thus, a second dose of antibiotics was given before starting   the Pitocin augmentation given the patient's history of fast labors.  The   patient did have an artificial rupture of membranes performed with clear   amniotic fluid.  Intrauterine pressure catheter was placed and the patient was   up on a birthing ball.  She was not interested in proceeding forward with any   pain medication.  She was examined at approximately 7:24 and was found to be   7 cm dilated, 80% effaced, and -2 station.  At 2110, she was 9, 90, and 0   station.  The patient began to have an urge to push, began pushing,   subsequently underwent a spontaneous vaginal delivery without any nuchal cord.    There did appear to be mild-to-moderate meconium.  There was easy delivery   of the shoulders and body.  Cord was clamped.  Infant was handed to the   waiting nursing staff.  Apgars were 3 and 8 at one and five minutes   respectively.  Placenta was delivered spontaneous and intact with a 3-vessel   cord.  Cord gases were saved and sent.  Cord arterial gas, pH of 6.96, base   excess of -17 with a bicarb of 18, pO2 of 12, and a pCO2 of 80.  Cord venous   pH was 6.93, pCO2 of 79.1, pO2 of 13.5, bicarb of 16 with a base excess of   -18.  There were no lacerations for repair.  The estimated blood loss for the   delivery was 200 mL.  The patient tolerated labor and delivery well.       ____________________________________     MD GERALD BEVERLY / SUNNY    DD:  2019 22:18:54  DT:  2019 03:18:17    D#:   [id number]  Job#:  [id number]

## 2019-11-15 VITALS
HEIGHT: 59 IN | SYSTOLIC BLOOD PRESSURE: 91 MMHG | WEIGHT: 163 LBS | OXYGEN SATURATION: 96 % | TEMPERATURE: 97.4 F | DIASTOLIC BLOOD PRESSURE: 51 MMHG | HEART RATE: 89 BPM | BODY MASS INDEX: 32.86 KG/M2 | RESPIRATION RATE: 15 BRPM

## 2019-11-15 PROCEDURE — A9270 NON-COVERED ITEM OR SERVICE: HCPCS | Performed by: SPECIALIST

## 2019-11-15 PROCEDURE — 700102 HCHG RX REV CODE 250 W/ 637 OVERRIDE(OP): Performed by: SPECIALIST

## 2019-11-15 RX ORDER — HYDROCODONE BITARTRATE AND ACETAMINOPHEN 5; 325 MG/1; MG/1
1 TABLET ORAL EVERY 4 HOURS PRN
Qty: 20 TAB | Refills: 0 | Status: SHIPPED | OUTPATIENT
Start: 2019-11-15 | End: 2019-11-22

## 2019-11-15 RX ORDER — IBUPROFEN 600 MG/1
600 TABLET ORAL EVERY 6 HOURS PRN
Qty: 30 TAB | Refills: 0 | Status: SHIPPED | OUTPATIENT
Start: 2019-11-15 | End: 2020-09-11

## 2019-11-15 RX ADMIN — IBUPROFEN 600 MG: 600 TABLET ORAL at 11:58

## 2019-11-15 RX ADMIN — VITAMIN A, VITAMIN C, VITAMIN D-3, VITAMIN E, VITAMIN B-1, VITAMIN B-2, NIACIN, VITAMIN B-6, CALCIUM, IRON, ZINC, COPPER 1 TABLET: 4000; 120; 400; 22; 1.84; 3; 20; 10; 1; 12; 200; 27; 25; 2 TABLET ORAL at 07:29

## 2019-11-15 RX ADMIN — HYDROCODONE BITARTRATE AND ACETAMINOPHEN 1 TABLET: 5; 325 TABLET ORAL at 11:58

## 2019-11-15 NOTE — DISCHARGE INSTRUCTIONS
POSTPARTUM DISCHARGE INSTRUCTIONS FOR MOM    YOB: 1985   Age: 34 y.o.               Admit Date: 11/13/2019     Discharge Date: 11/15/2019  Attending Doctor:  William Zamarripa M.D.                  Allergies:  Patient has no known allergies.    Discharged to home by car. Discharged via wheelchair, hospital escort: Yes.  Special equipment needed: Not Applicable  Belongings with: Personal  Be sure to schedule a follow-up appointment with your primary care doctor or any specialists as instructed.     Discharge Plan:   Diet Plan: Discussed  Activity Level: Discussed  Confirmed Follow up Appointment: Patient to Call and Schedule Appointment  Influenza Vaccine Indication: Indicated: 9 to 64 years of age  Influenza Vaccine Given - only chart on this line when given: Influenza Vaccine Given (See MAR)    REASONS TO CALL YOUR OBSTETRICIAN:  1.   Persistent fever or shaking chills (Temperature higher than 100.4)  2.   Heavy bleeding (soaking more than 1 pad per hour); Passing clots  3.   Foul odor from vagina  4.   Mastitis (Breast infection; breast pain, chills, fever, redness)  5.   Urinary pain, burning or frequency  6.   Episiotomy infection  7.   Abdominal incision infection  8.   Severe depression longer than 24 hours    HAND WASHING  · Prior to handling the baby.  · Before breastfeeding or bottle feeding baby.  · After using the bathroom or changing the baby's diaper.      VAGINAL CARE  · Nothing inside vagina for 6 weeks: no sexual intercourse, tampons or douching.  · Bleeding may continue for 2-4 weeks.  Amount may vary.    · Call your physician for heavy bleeding which means soaking more than 1 pad per hour    BIRTH CONTROL  · It is possible to become pregnant at any time after delivery and while breastfeeding.  · Plan to discuss a method of birth control with your physician at your follow up visit. visit.    DIET AND ELIMINATION  · Eating more fiber (bran cereal, fruits, and vegetables) and drinking plenty  "of fluids will help to avoid constipation.  · Urinary frequency after childbirth is normal.    POSTPARTUM BLUES  During the first few days after birth, you may experience a sense of the \"blues\" which may include impatience, irritability or even crying.  These feeling come and go quickly.  However, as many as 1 in 10 women experience emotional symptoms known as postpartum depression.    Postpartum depression:  May start as early as the second or third day after delivery or take several weeks or months to develop.  Symptoms of \"blues\" are present, but are more intense:  Crying spells; loss of appetite; feelings of hopelessness or loss of control; fear of touching the baby; over concern or no concern at all about the baby; little or no concern about your own appearance/caring for yourself; and/or inability to sleep or excessive sleeping.  Contact your physician if you are experiencing any of these symptoms.    Crisis Hotline:  · [place] Crisis Hotline:  2-210-UZIVVHY  Or 1-870.318.4839  · [place] Crisis Hotline:  1-584.264.9251  Or 092-321-7681    PREVENTING SHAKEN BABY:  If you are angry or stressed, PUT THE BABY IN THE CRIB, step away, take some deep breaths, and wait until you are calm to care for the baby.  DO NOT SHAKE THE BABY.  You are not alone, call a supporter for help.    · Crisis [facility] Center 24/7 crisis line 297-121-3186 or 1-813.519.2983  · You can also text them, text \"ANSWER\" to 978448      DEPRESSION / SUICIDE RISK:  As you are discharged from this [facility] facility, it is important to learn how to keep safe from harming yourself.    Recognize the warning signs:  · Abrupt changes in personality, positive or negative- including increase in energy   · Giving away possessions  · Change in eating patterns- significant weight changes-  positive or negative  · Change in sleeping patterns- unable to sleep or sleeping all the time   · Unwillingness or inability to communicate  · Depression  · Unusual " sadness, discouragement and loneliness  · Talk of wanting to die  · Neglect of personal appearance   · Rebelliousness- reckless behavior  · Withdrawal from people/activities they love  · Confusion- inability to concentrate     If you or a loved one observes any of these behaviors or has concerns about self-harm, here's what you can do:  · Talk about it- your feelings and reasons for harming yourself  · Remove any means that you might use to hurt yourself (examples: pills, rope, extension cords, firearm)  · Get professional help from the community (Mental Health, Substance Abuse, psychological counseling)  · Do not be alone:Call your Safe Contact- someone whom you trust who will be there for you.  · Call your local CRISIS HOTLINE 250-6171 or 916-012-0708  · Call your local Children's Mobile Crisis Response Team [place] (573) 476-1489 or www.vLex  · Call the toll free National Suicide Prevention Hotlines   · National Suicide Prevention Lifeline 923-796-CRRV (7136)  · National Hope Line Network 800-SUICIDE (083-5859)    DISCHARGE SURVEY:  Thank you for choosing [facility].  We hope we provided you with very good care.  You may be receiving a survey in the mail.  Please fill it out.  Your opinion is valuable to us.    ADDITIONAL EDUCATIONAL MATERIALS GIVEN TO PATIENT:        My signature on this form indicates that:  1.  I have reviewed and understand the above information  2.  My questions regarding this information have been answered to my satisfaction.  3.  I have formulated a plan with my discharge nurse to obtain my prescribed medication for home.

## 2019-11-15 NOTE — PROGRESS NOTES
Patient assessment completed. Pt has red discoloration to face - states she had a red face after delivering older sibling. Pt denies itchiness or other discomfort. States last delivery redness resolved after several days. Discussed pain management plan and patient to request medication as needed. Patient denies dizziness and headaches; states she is voiding w/o difficulty. Reviewed plan of care, all questions answered, and rounding in place.

## 2019-11-15 NOTE — CARE PLAN
Problem: Altered physiologic condition related to immediate post-delivery state and potential for bleeding/hemorrhage  Goal: Patient physiologically stable as evidenced by normal lochia, palpable uterine involution and vital signs within normal limits  Outcome: PROGRESSING AS EXPECTED  Note:   Fundus firm at umbilicus with light lochia.      Problem: Alteration in comfort related to episiotomy, vaginal repair and/or after birth pains  Goal: Patient is able to ambulate, care for self and infant  Outcome: PROGRESSING AS EXPECTED  Note:   Ambulating  and voiding without difficulty.   Goal: Patient verbalizes acceptable pain level  Outcome: PROGRESSING AS EXPECTED  Note:   Verbalizes acceptable pain relief with pain medication being given as requested.

## 2019-11-15 NOTE — PROGRESS NOTES
Assessment done vital signs stable. Patient progressing according to plan of care. Fundus firm at the umbilicus with light lochia. Patient up voiding without difficulty. Ambulating with steady gait.  Bottle feeding infant on demand. POC discussed. Pt claims she will call for any needs

## 2019-11-15 NOTE — PROGRESS NOTES
Progress Note    Subjective:   Doing well. No issues or concerns. Pain well controlled. No sig bleeding or discharge. BF well.    Objective Data:  Recent Labs     11/13/19  0545 11/14/19  0600   WBC 10.5 15.4*   RBC 4.38 4.02*   HEMOGLOBIN 12.7 11.7*   HEMATOCRIT 38.6 35.0*   MCV 88.1 87.1   MCH 29.0 29.1   MCHC 32.9* 33.4*   RDW 42.6 42.7   PLATELETCT 233 213   MPV 9.8 9.6           Vitals:    11/14/19 0200 11/14/19 0600 11/14/19 1800 11/15/19 0600   BP: 114/65 108/64 115/67 (!) 91/51   Pulse: 93 80 86 89   Resp: 18 17 16 15   Temp: 36.1 °C (97 °F) 36.6 °C (97.8 °F) 36.3 °C (97.3 °F) 36.3 °C (97.4 °F)   TempSrc: Temporal Temporal Temporal Temporal   SpO2: 97% 94% 98% 96%   Weight:       Height:         ABdomen: soft non tender fundus at umbilicus  Perineum: no sig bleeding  Ext:non tender calves    No intake or output data in the 24 hours ending 11/15/19 0801    Current Facility-Administered Medications   Medication Dose Route Frequency Provider Last Rate Last Dose   • ondansetron (ZOFRAN ODT) dispertab 4 mg  4 mg Oral Q6HRS PRN William Zamarripa M.D.        Or   • ondansetron (ZOFRAN) syringe/vial injection 4 mg  4 mg Intravenous Q6HRS PRN William Zamarripa M.D.   4 mg at 11/13/19 1947   • oxytocin (PITOCIN) infusion (for postpartum)   mL/hr Intravenous Continuous William Zamarripa M.D. 125 mL/hr at 11/13/19 2259 125 mL/hr at 11/13/19 2259   • ibuprofen (MOTRIN) tablet 600 mg  600 mg Oral Q6HRS PRN William Zamarripa M.D.   600 mg at 11/14/19 1743   • acetaminophen (TYLENOL) tablet 325 mg  325 mg Oral Q4HRS PRN William Zamarripa M.D.       • HYDROcodone-acetaminophen (NORCO) 5-325 MG per tablet 1 Tab  1 Tab Oral Q4HRS PRN William Zamarripa M.D.   1 Tab at 11/14/19 2053   • oxyCODONE-acetaminophen (PERCOCET) 5-325 MG per tablet 2 Tab  2 Tab Oral Q4HRS PRN William Zamarripa M.D.       • LR infusion   Intravenous PRN William Zamarripa M.D.       • miSOPROStol (CYTOTEC) tablet 600 mcg  600 mcg Rectal Once PRN William ADHIKARI  DELROY Zamarripa       • docusate sodium (COLACE) capsule 100 mg  100 mg Oral BID PRN William Zamarripa M.D.       • bisacodyl (DULCOLAX) suppository 10 mg  10 mg Rectal PRN William Zamarripa M.D.       • prenatal plus vitamin (STUARTNATAL 1+1) 27-1 MG tablet 1 Tab  1 Tab Oral QAM William Zamarripa M.D.   1 Tab at 11/15/19 0729       A/P S/P . Doing well. Plan to proceed with the usual pp management and will discharge home today.

## 2019-11-15 NOTE — CARE PLAN
Problem: Altered physiologic condition related to immediate post-delivery state and potential for bleeding/hemorrhage  Goal: Patient physiologically stable as evidenced by normal lochia, palpable uterine involution and vital signs within normal limits  Outcome: PROGRESSING AS EXPECTED  Note:   Patient is physiologically stable as evidenced by scant to light lochia rubra, firm fundus at the umbilicus, and vital signs WDL. Will continue to monitor patient condition.       Problem: Potential for postpartum infection related to presence of episiotomy/vaginal tear and/or uterine contamination  Goal: Patient will be absent from signs and symptoms of infection  Outcome: PROGRESSING AS EXPECTED  Note:   Patient is afebrile and absent for other signs/symptoms of infection. Vital signs WDL.       Problem: Alteration in comfort related to episiotomy, vaginal repair and/or after birth pains  Goal: Patient is able to ambulate, care for self and infant  Outcome: PROGRESSING AS EXPECTED  Note:   Discussed pain management and verbalization of pain utilizing the 0-10 pain scale. White board updated with times of pain medication availability and pt requests pain medication be provided as available. Discussed non-pharmacological pain control therapies and pt declined at this time, stating no need. Pt ambulates with a steady gait and demonstrates the ability to participate in ADLs and provide care for  son.

## 2020-08-26 ENCOUNTER — GYNECOLOGY VISIT (OUTPATIENT)
Dept: OBGYN | Facility: CLINIC | Age: 35
End: 2020-08-26
Payer: MEDICAID

## 2020-08-26 VITALS — BODY MASS INDEX: 31.37 KG/M2 | WEIGHT: 155.3 LBS | DIASTOLIC BLOOD PRESSURE: 66 MMHG | SYSTOLIC BLOOD PRESSURE: 110 MMHG

## 2020-08-26 DIAGNOSIS — N93.8 DUB (DYSFUNCTIONAL UTERINE BLEEDING): ICD-10-CM

## 2020-08-26 LAB
INT CON NEG: NEGATIVE
INT CON POS: POSITIVE
POC URINE PREGNANCY TEST: POSITIVE

## 2020-08-26 PROCEDURE — 99213 OFFICE O/P EST LOW 20 MIN: CPT | Mod: 25 | Performed by: OBSTETRICS & GYNECOLOGY

## 2020-08-26 PROCEDURE — 76830 TRANSVAGINAL US NON-OB: CPT | Performed by: OBSTETRICS & GYNECOLOGY

## 2020-08-26 PROCEDURE — 81025 URINE PREGNANCY TEST: CPT | Performed by: OBSTETRICS & GYNECOLOGY

## 2020-08-26 NOTE — PROGRESS NOTES
Ana Hernandez,  35 y.o.  female presents today with a C/O of :oligomenorrhea. Pt   No LMP recorded. Patient is pregnant.       Subjective : Nausea/Vomiting: No:  Abdominal /pelvic cramping : No :   vaginal bleeding:No      Menstrual Flow : moderate   GYN ROS:  normal menses, no abnormal bleeding, pelvic pain or discharge, no breast pain or new or enlarging lumps on self exam      Past Medical History:   Diagnosis Date   • Thyroid disease        History reviewed. No pertinent surgical history.    Current Birth control:  none    OB History    Para Term  AB Living   5 4 4     4   SAB TAB Ectopic Molar Multiple Live Births           0 4      # Outcome Date GA Lbr Arnaud/2nd Weight Sex Delivery Anes PTL Lv   5 Current            4 Term 19 39w0d 15:01 / 00:31 3.29 kg (7 lb 4.1 oz) M Vag-Spont None N NAIN   3 Term 13 41w0d  3.685 kg (8 lb 2 oz) M Vag-Spont None N NAIN      Birth Comments: Pt states delivery wthout complications.   2 Term 07 38w0d  3.345 kg (7 lb 6 oz) M Vag-Spont None N NAIN      Birth Comments: Pt states no complications   1 Term 05 38w0d  3.345 kg (7 lb 6 oz) M Vag-Spont EPI N NAIN      Birth Comments: Pt states no complications           Allergy:      Patient has no known allergies.    Exam;    /66   Wt 70.4 kg (155 lb 4.8 oz)   BMI 31.37 kg/m²   well-appearing, well-hydrated, well-nourished, obese, comfortable, in no apparent distress  normal;   PERRLA, EOMI, fundi grossly normal, no papilledema, no AV nicking, sclera clear  Clear  RRR No M  abdomen is soft without significant tenderness, masses, organomegaly or guarding  External genitalia normal, Vagina normal without dischargeLab.    Recent Results (from the past 336 hour(s))   POCT Pregnancy    Collection Time: 20  3:23 PM   Result Value Ref Range    POC Urine Pregnancy Test positive Negative    Internal Control Positive Positive     Internal Control Negative Negative      Ultrasound :      Per my Read   Transvaginal   First trimester findings: Intrauterine gestational sac seen: yes  Gestational sac summary: fetal pole seen, yolk sac seen, EGA: 10 weeks + 2 days  Fetal cardiac activity: present  Crown-rump length: 3.38 cm  AYSE 03/22/2021  Assessment:    dysfunctional uterine bleeding    Plan:  2 weeks for NOB

## 2020-08-26 NOTE — NON-PROVIDER
Patient here for GYN/DUB.  UPT= Positive  LMP= 6/27/2020  AYSE= 4/3/21  GA= 8w4d  Last pap 2019 PT states WNL  Phone number: [phone]  Pharmacy verified  C/o Pt states occasionally getting some cramping

## 2020-09-11 ENCOUNTER — INITIAL PRENATAL (OUTPATIENT)
Dept: OBGYN | Facility: CLINIC | Age: 35
End: 2020-09-11
Payer: MEDICAID

## 2020-09-11 VITALS — BODY MASS INDEX: 31.31 KG/M2 | SYSTOLIC BLOOD PRESSURE: 108 MMHG | WEIGHT: 155 LBS | DIASTOLIC BLOOD PRESSURE: 60 MMHG

## 2020-09-11 DIAGNOSIS — Z34.81 ENCOUNTER FOR SUPERVISION OF OTHER NORMAL PREGNANCY IN FIRST TRIMESTER: ICD-10-CM

## 2020-09-11 DIAGNOSIS — Z3A.12 12 WEEKS GESTATION OF PREGNANCY: ICD-10-CM

## 2020-09-11 PROBLEM — Z34.91 SUPERVISION OF NORMAL PREGNANCY IN FIRST TRIMESTER: Status: ACTIVE | Noted: 2020-09-11

## 2020-09-11 LAB
APPEARANCE UR: NORMAL
BILIRUB UR STRIP-MCNC: NORMAL MG/DL
COLOR UR AUTO: NORMAL
GLUCOSE UR STRIP.AUTO-MCNC: NEGATIVE MG/DL
KETONES UR STRIP.AUTO-MCNC: NORMAL MG/DL
LEUKOCYTE ESTERASE UR QL STRIP.AUTO: NEGATIVE
NITRITE UR QL STRIP.AUTO: NEGATIVE
PH UR STRIP.AUTO: 7 [PH] (ref 5–8)
PROT UR QL STRIP: NORMAL MG/DL
RBC UR QL AUTO: NEGATIVE
SP GR UR STRIP.AUTO: 1.02
UROBILINOGEN UR STRIP-MCNC: NORMAL MG/DL

## 2020-09-11 PROCEDURE — 81002 URINALYSIS NONAUTO W/O SCOPE: CPT | Performed by: PHYSICIAN ASSISTANT

## 2020-09-11 PROCEDURE — 0500F INITIAL PRENATAL CARE VISIT: CPT | Performed by: PHYSICIAN ASSISTANT

## 2020-09-11 ASSESSMENT — ENCOUNTER SYMPTOMS
GASTROINTESTINAL NEGATIVE: 1
EYES NEGATIVE: 1
CARDIOVASCULAR NEGATIVE: 1
RESPIRATORY NEGATIVE: 1
MUSCULOSKELETAL NEGATIVE: 1
NEUROLOGICAL NEGATIVE: 1
CONSTITUTIONAL NEGATIVE: 1
PSYCHIATRIC NEGATIVE: 1

## 2020-09-11 NOTE — PROGRESS NOTES
Subjective:      Ana Hernandez is a 35 y.o. female who presents with New ob visit. Pt unsure of LMP, but had  at 10 wk confirming AYSE 3/22/21. Pt has hx of 4  at term without complications, denies GDM, PIH or delivery complications. Pt denies PMHx, though has had hospitalizations for non-pregnant pyelo in past. Denies SHx. Denies tob, etoh or drug use. NKDA. Taking PNV only. Pt currently denies cramping, bleeding or pain. No FM.           HPI    Review of Systems   Constitutional: Negative.    HENT: Negative.    Eyes: Negative.    Respiratory: Negative.    Cardiovascular: Negative.    Gastrointestinal: Negative.    Genitourinary: Negative.    Musculoskeletal: Negative.    Skin: Negative.    Neurological: Negative.    Endo/Heme/Allergies: Negative.    Psychiatric/Behavioral: Negative.    All other systems reviewed and are negative.         Objective:     /60   Wt 70.3 kg (155 lb)   LMP 2020   BMI 31.31 kg/m²      Physical Exam  Vitals signs reviewed.   Constitutional:       Appearance: She is well-developed.   HENT:      Head: Normocephalic and atraumatic.   Eyes:      Pupils: Pupils are equal, round, and reactive to light.   Neck:      Musculoskeletal: Normal range of motion and neck supple.      Thyroid: No thyromegaly.   Cardiovascular:      Rate and Rhythm: Normal rate and regular rhythm.      Heart sounds: Normal heart sounds.   Pulmonary:      Effort: Pulmonary effort is normal. No respiratory distress.      Breath sounds: Normal breath sounds.   Abdominal:      General: Bowel sounds are normal. There is no distension.      Palpations: Abdomen is soft.      Tenderness: There is no abdominal tenderness.   Genitourinary:     Exam position: Supine.      Labia:         Right: No rash or tenderness.         Left: No rash or tenderness.       Vagina: Normal. No signs of injury and foreign body. No vaginal discharge or erythema.      Cervix: No cervical motion tenderness.      Uterus:  Enlarged (Gravid, uterus c/w 12-13 wk size). Not deviated and not tender.       Adnexa:         Right: No mass or tenderness.          Left: No mass or tenderness.     Skin:     General: Skin is warm and dry.      Findings: No erythema.   Neurological:      Mental Status: She is alert.      Deep Tendon Reflexes: Reflexes are normal and symmetric.   Psychiatric:         Behavior: Behavior normal.         Thought Content: Thought content normal.                 Assessment/Plan:        1. 12 weeks gestation of pregnancy  - POCT Urinalysis    2. Encounter for supervision of other normal pregnancy in first trimester  - F/u 4 wk, US 6-8 wk  - Wants BTL if C/S needed  - PAP done Dec or Jan 2020 - will request records today  - PREG CNTR PRENATAL PN; Future  - Chlamydia/GC PCR Urine Or Swab; Future  - URINE DRUG SCREEN W/CONF (AR); Future  - HEP C VIRUS ANTIBODY; Future  - US-OB 2ND 3RD TRI COMPLETE; Future

## 2020-09-11 NOTE — PROGRESS NOTES
Pt. Here for NOB visit today.  # [phone]  First prenatal care  Pt. States sometimes she has dizzy spells   Pharmacy verified  Pt would like AFP  Pt declines CF   Chaperone offered and not indicated  Last PAP: 1/6/2020, WNL per verbal from [facility] . Results to be faxed. When received results will be scanned into media.

## 2020-09-14 ASSESSMENT — EDINBURGH POSTNATAL DEPRESSION SCALE (EPDS)
THE THOUGHT OF HARMING MYSELF HAS OCCURRED TO ME: NEVER
I HAVE BEEN SO UNHAPPY THAT I HAVE BEEN CRYING: NO, NEVER
I HAVE BEEN ABLE TO LAUGH AND SEE THE FUNNY SIDE OF THINGS: AS MUCH AS I ALWAYS COULD
I HAVE BEEN ANXIOUS OR WORRIED FOR NO GOOD REASON: NO, NOT AT ALL
I HAVE FELT SCARED OR PANICKY FOR NO GOOD REASON: NO, NOT AT ALL
I HAVE FELT SAD OR MISERABLE: NOT VERY OFTEN
I HAVE LOOKED FORWARD WITH ENJOYMENT TO THINGS: AS MUCH AS I EVER DID
TOTAL SCORE: 3
THINGS HAVE BEEN GETTING ON TOP OF ME: NO, MOST OF THE TIME I HAVE COPED QUITE WELL
I HAVE BEEN SO UNHAPPY THAT I HAVE HAD DIFFICULTY SLEEPING: NOT AT ALL
I HAVE BLAMED MYSELF UNNECESSARILY WHEN THINGS WENT WRONG: NOT VERY OFTEN

## 2020-09-16 DIAGNOSIS — Z34.81 ENCOUNTER FOR SUPERVISION OF OTHER NORMAL PREGNANCY IN FIRST TRIMESTER: ICD-10-CM

## 2020-09-28 DIAGNOSIS — Z34.81 ENCOUNTER FOR SUPERVISION OF OTHER NORMAL PREGNANCY IN FIRST TRIMESTER: ICD-10-CM

## 2020-10-09 ENCOUNTER — ROUTINE PRENATAL (OUTPATIENT)
Dept: OBGYN | Facility: CLINIC | Age: 35
End: 2020-10-09
Payer: MEDICAID

## 2020-10-09 VITALS — WEIGHT: 156 LBS | SYSTOLIC BLOOD PRESSURE: 112 MMHG | BODY MASS INDEX: 31.51 KG/M2 | DIASTOLIC BLOOD PRESSURE: 60 MMHG

## 2020-10-09 DIAGNOSIS — Z34.82 ENCOUNTER FOR SUPERVISION OF OTHER NORMAL PREGNANCY, SECOND TRIMESTER: Primary | ICD-10-CM

## 2020-10-09 PROCEDURE — 90040 PR PRENATAL FOLLOW UP: CPT | Performed by: NURSE PRACTITIONER

## 2020-10-09 NOTE — PROGRESS NOTES
Pt here today for OB follow up  Pt states no complaints   Reports +FM  Good # [phone]  Pharmacy Confirmed.  Chaperone offered and not indicated.   Pt would like AFP, lab slip given today   Pt would like flu vaccine, give at next visit. Unable to administer today due to MD not being in clinic.

## 2020-10-09 NOTE — PROGRESS NOTES
S) Pt is a 35 y.o.   at 16w4d  gestation. Routine prenatal care today. Wondering about having a primary  so she can get BSO.  Discussed with pt she is welcome to meet with MD to discuss however, advised recovery and risks more extensive from c/s and she does have the option to return PP and have lap BSO.  In the meantime, advised we can try depo shot to get her through to surgery.    Fetal movement Normal  Cramping no  VB no  LOF no   Denies dysuria. Generally feels well today. Good self-care activities identified. Denies headaches, swelling, visual changes, or epigastric pain .     O) See flow sheet for vital signs and fetal measurements.          Labs:       PNL: WNL       GCT:       AFP: Not Examined       GBS: N/A       Pertinent ultrasound -  2020    A) IUP at 16w4d       S=D         Patient Active Problem List    Diagnosis Date Noted   • Supervision of normal pregnancy in first trimester 2020          SVE: deferred         TDAP: no       FLU: no        BTL: yes       : no       C/S Consent: no       IOL or C/S scheduled: no       LAST PAP: 2020 negative         P) s/s ptl vs general discomforts. Fetal movements reviewed. General ed and anticipatory guidance. Nutrition/exercise/vitamin. Plans breast Plans pp contraception- unsure  Continue PNV.   Desires AFP testing  RTC 4 weeks or PRN.

## 2020-11-06 ENCOUNTER — ROUTINE PRENATAL (OUTPATIENT)
Dept: OBGYN | Facility: CLINIC | Age: 35
End: 2020-11-06
Payer: MEDICAID

## 2020-11-06 VITALS — BODY MASS INDEX: 31.91 KG/M2 | SYSTOLIC BLOOD PRESSURE: 110 MMHG | WEIGHT: 158 LBS | DIASTOLIC BLOOD PRESSURE: 66 MMHG

## 2020-11-06 DIAGNOSIS — Z34.82 ENCOUNTER FOR SUPERVISION OF OTHER NORMAL PREGNANCY, SECOND TRIMESTER: Primary | ICD-10-CM

## 2020-11-06 DIAGNOSIS — O35.BXX0 ECHOGENIC FOCUS OF HEART OF FETUS AFFECTING ANTEPARTUM CARE OF MOTHER, SINGLE OR UNSPECIFIED FETUS: ICD-10-CM

## 2020-11-06 PROCEDURE — 90040 PR PRENATAL FOLLOW UP: CPT | Performed by: NURSE PRACTITIONER

## 2020-11-06 NOTE — NON-PROVIDER
OB follow up   + fetal movement.  No VB, LOF or UC's.  Flu vaccine offered, wants next visit.  Phone # [phone]  Preferred pharmacy confirmed.  US on 11/12/2020

## 2020-11-06 NOTE — PROGRESS NOTES
S) Pt is a 35 y.o.   at 20w4d  gestation. Routine prenatal care today. No concerns today.    Fetal movement Normal  Cramping no  VB no  LOF no   Denies dysuria. Generally feels well today. Good self-care activities identified. Denies headaches, swelling, visual changes, or epigastric pain .     O) See flow sheet for vital signs and fetal measurements.          Labs:       PNL: WNL       GCT:       AFP: Not Examined       GBS: N/A       Pertinent ultrasound -            A) IUP at 20w4d       S=D         Patient Active Problem List    Diagnosis Date Noted   • Supervision of normal pregnancy in first trimester 2020          SVE: deferred         TDAP: no       FLU: no        BTL: yes       : no       C/S Consent: no       IOL or C/S scheduled: no       LAST PAP: 20 negative         P) s/s ptl vs general discomforts. Fetal movements reviewed. General ed and anticipatory guidance. Nutrition/exercise/vitamin. Plans breast Plans pp contraception- BSO  Continue PNV.   AFP results not back yet, did on Monday  RTC 4 weeks or PRN.

## 2020-11-12 ENCOUNTER — APPOINTMENT (OUTPATIENT)
Dept: RADIOLOGY | Facility: IMAGING CENTER | Age: 35
End: 2020-11-12
Attending: PHYSICIAN ASSISTANT
Payer: MEDICAID

## 2020-11-12 DIAGNOSIS — Z34.81 ENCOUNTER FOR SUPERVISION OF OTHER NORMAL PREGNANCY IN FIRST TRIMESTER: ICD-10-CM

## 2020-11-12 PROCEDURE — 76805 OB US >/= 14 WKS SNGL FETUS: CPT | Mod: TC | Performed by: PHYSICIAN ASSISTANT

## 2020-11-13 DIAGNOSIS — Z34.82 ENCOUNTER FOR SUPERVISION OF OTHER NORMAL PREGNANCY, SECOND TRIMESTER: ICD-10-CM

## 2020-11-13 PROBLEM — O35.BXX0 ECHOGENIC FOCUS OF HEART OF FETUS AFFECTING ANTEPARTUM CARE OF MOTHER: Status: ACTIVE | Noted: 2020-11-13

## 2020-12-03 ENCOUNTER — ROUTINE PRENATAL (OUTPATIENT)
Dept: OBGYN | Facility: CLINIC | Age: 35
End: 2020-12-03
Payer: MEDICAID

## 2020-12-03 VITALS — DIASTOLIC BLOOD PRESSURE: 50 MMHG | SYSTOLIC BLOOD PRESSURE: 110 MMHG | BODY MASS INDEX: 32.44 KG/M2 | WEIGHT: 160.6 LBS

## 2020-12-03 DIAGNOSIS — O35.BXX0 ECHOGENIC FOCUS OF HEART OF FETUS AFFECTING ANTEPARTUM CARE OF MOTHER, SINGLE OR UNSPECIFIED FETUS: ICD-10-CM

## 2020-12-03 DIAGNOSIS — Z3A.24 24 WEEKS GESTATION OF PREGNANCY: ICD-10-CM

## 2020-12-03 PROBLEM — Z34.92 SUPERVISION OF NORMAL PREGNANCY IN SECOND TRIMESTER: Status: ACTIVE | Noted: 2020-09-11

## 2020-12-03 PROCEDURE — 90471 IMMUNIZATION ADMIN: CPT | Performed by: NURSE PRACTITIONER

## 2020-12-03 PROCEDURE — 90686 IIV4 VACC NO PRSV 0.5 ML IM: CPT | Performed by: NURSE PRACTITIONER

## 2020-12-03 PROCEDURE — 90040 PR PRENATAL FOLLOW UP: CPT | Performed by: NURSE PRACTITIONER

## 2020-12-03 NOTE — PROGRESS NOTES
SUBJECTIVE:  Pt is a 35 y.o.   at 24w3d  gestation. Presents today for follow-up prenatal care. Reports no issues at this time.  Reports fetal movement. Denies regular cramping/contractions, bleeding or leaking of fluid. Denies dysuria, headaches, N/V. Generally feels well today.  Reports she was told she had a low thyroid hormone at some point but was never on meds for it, just that she should check it every now and then.     OBJECTIVE:  - See prenatal vitals flow  -   Vitals:    20 1355   BP: 110/50   Weight: 72.8 kg (160 lb 9.6 oz)                 ASSESSMENT:   - IUP at 24w3d    - S=D   -   Patient Active Problem List    Diagnosis Date Noted   • Echogenic focus of heart of fetus affecting antepartum care of mother 2020   • Supervision of normal pregnancy in second trimester 2020         PLAN:  - S/sx pregnancy and labor warning signs vs general discomforts discussed  - Fetal movements and/or kick counts reviewed   - Adequate hydration reinforced  - Nutrition/exercise/vitamin education; continue PNV  - GCT ordered as well as thyroid lab  - S/p Flu vacc today   - Anticipatory guidance given  - RTC in 4 weeks for follow-up prenatal care

## 2020-12-03 NOTE — PROGRESS NOTES
OB follow up   + fetal movement. Active  No VB, LOF or UC's.  Flu vaccine offered Today   Phone #  [phone]  Preferred pharmacy confirmed.  No complaints as of today     NDC: 32507-287-38  LOT#: [id number]  Expiration Date: 2021  Dose: 0.5mL  Site: Left Deltoid  Patient educated on use and side effects of medication. Name and  verified prior to injection. Pt tolerated? Well   Verified by EVONNE  Administered by Parul Singh, Med Ass't at 2:55 PM.  Patient Provided Medication: no

## 2020-12-21 DIAGNOSIS — Z3A.24 24 WEEKS GESTATION OF PREGNANCY: ICD-10-CM

## 2020-12-31 ENCOUNTER — ROUTINE PRENATAL (OUTPATIENT)
Dept: OBGYN | Facility: CLINIC | Age: 35
End: 2020-12-31
Payer: MEDICAID

## 2020-12-31 VITALS — SYSTOLIC BLOOD PRESSURE: 110 MMHG | DIASTOLIC BLOOD PRESSURE: 60 MMHG | BODY MASS INDEX: 32.32 KG/M2 | WEIGHT: 160 LBS

## 2020-12-31 DIAGNOSIS — O26.13 LOW WEIGHT GAIN DURING PREGNANCY IN THIRD TRIMESTER: ICD-10-CM

## 2020-12-31 DIAGNOSIS — Z34.83 ENCOUNTER FOR SUPERVISION OF OTHER NORMAL PREGNANCY, THIRD TRIMESTER: ICD-10-CM

## 2020-12-31 PROCEDURE — 90040 PR PRENATAL FOLLOW UP: CPT | Performed by: ADVANCED PRACTICE MIDWIFE

## 2020-12-31 NOTE — LETTER
"Count Your Baby's Movements  Another step to a healthy delivery    Ana Garcia              Dept: [phone]    How Many Weeks Pregnant? 28w3d    Date to Begin Countin2020              How to use this chart    One way for your physician to keep track of your baby's health is by knowing how often the baby moves (or \"kicks\") in your womb.  You can help your physician to do this by using this chart every day.    Every day, you should see how many hours it takes for your baby to move 10 times.  Start in the morning, as soon as you get up.    · First, write down the time your baby moves until you get to 10.  · Check off one box every time your baby moves until you get to 10.  · Write down the time you finished counting in the last column.  · Total how long it took to count up all 10 movements.  · Finally, fill in the box that shows how long this took.  After counting 10 movements, you no longer have to count any more that day.  The next morning, just start counting again as soon as you get up.    What should you call a \"movement\"?  It is hard to say, because it will feel different from one mother to another and from one pregnancy to the next.  The important thing is that you count the movements the same way throughout your pregnancy.  If you have more questions, you should ask your physician.    Count carefully every day!  SAMPLE:  Week 28    How many hours did it take to feel 10 movements?       Start  Time     1     2     3     4     5     6     7     8     9     10   Finish Time   Mon 8:20 ·  ·  ·  ·  ·  ·  ·  ·  ·  ·  11:40                  Sat               Sun                 IMPORTANT: You should contact your physician if it takes more than two hours for you to feel 10 movements.  Each morning, write down the time and start to count the movements of your baby.  Keep track by checking off one box every time you feel one movement.  When you " "have felt 10 \"kicks\", write down the time you finished counting in the last column.  Then fill in the   box (over the check oni) for the number of hours it took.  Be sure to read the complete instructions on the previous page.            "

## 2020-12-31 NOTE — PROGRESS NOTES
SUBJECTIVE:  Pt is a 35 y.o.   at 28w3d  gestation. Presents today for follow-up prenatal care. Has not been seen in ER or L & D since last visit. Reports good  fetal movement.     Leaking of fluid?       no    Dysuria?       no    Headaches?      no    N/V?          no    Cramping/contractions?      no        OBJECTIVE:   /60   Wt 72.6 kg (160 lb)   LMP 2020   BMI 32.32 kg/m²   Patients' weight gain, fluid intake and exercise level discussed.  Vitals, fundal height , fetal position, and FHR reviewed on flowsheet    Lab:No results found for this or any previous visit (from the past 336 hour(s)).    ASSESSMENT/ PLAN:   - IUP at 28w3d    - S < D   -   Patient Active Problem List    Diagnosis Date Noted   • Echogenic focus of heart of fetus affecting antepartum care of mother 2020   • Supervision of normal pregnancy in second trimester 2020         Tdap: declines  PP BCM: BTL consent signed today. Discussed that she can have done 6 weeks postpartum if she has     US ordered for patient due to low weight gain.     - S/sx pregnancy and labor warning signs vs general discomforts discussed  - Fetal movements and kick counts reviewed. Adequate hydration reinforced.  - Did discuss current COVID policies related to outpatient and inpatient visits.   - Anticipatory guidance provided.   - RTC in 2 weeks for routine prenatal care.

## 2020-12-31 NOTE — PROGRESS NOTES
Pt here today for OB follow up  Pt states no complaints   Reports +FM  Good # [phone]  Pharmacy Confirmed.  Chaperone offered and not indicated.  Pt given ERIC sheet and instructions    Pt would like BTL, consent signed  Pt declines TDAP vaccine

## 2021-01-14 ENCOUNTER — ROUTINE PRENATAL (OUTPATIENT)
Dept: OBGYN | Facility: CLINIC | Age: 36
End: 2021-01-14
Payer: MEDICAID

## 2021-01-14 VITALS — DIASTOLIC BLOOD PRESSURE: 66 MMHG | BODY MASS INDEX: 32.32 KG/M2 | WEIGHT: 160 LBS | SYSTOLIC BLOOD PRESSURE: 114 MMHG

## 2021-01-14 DIAGNOSIS — Z34.83 ENCOUNTER FOR SUPERVISION OF OTHER NORMAL PREGNANCY, THIRD TRIMESTER: ICD-10-CM

## 2021-01-14 PROCEDURE — 90040 PR PRENATAL FOLLOW UP: CPT | Performed by: ADVANCED PRACTICE MIDWIFE

## 2021-01-14 NOTE — PROGRESS NOTES
Pt here today for OB follow up  Pt states no complaints   Reports +  Good # [phone]  Pharmacy Confirmed.  Chaperone offered and not indicated.

## 2021-01-14 NOTE — PROGRESS NOTES
Has patient been referred to outside provider?   no    Records available on chart?   n/a    Had physician visit? no  Indication:  none    SUBJECTIVE:  Pt is a 35 y.o.   at 30w3d  gestation. Presents today for follow-up prenatal care. Has not been seen in ER or L & D since last visit. Reports good  fetal movement.     Leaking of fluid?       no    Dysuria?       no    Headaches?      no    N/V?          no    Cramping/contractions?      no    Patient with questions about COVID and hospital.    OBJECTIVE:   /66   Wt 72.6 kg (160 lb)   LMP 2020   BMI 32.32 kg/m²   Patients' weight gain, fluid intake and exercise level discussed.  Vitals, fundal height , fetal position, and FHR reviewed on flowsheet    Lab:No results found for this or any previous visit (from the past 336 hour(s)).    ASSESSMENT/ PLAN:   - IUP at 30w3d    - S = D   -   Patient Active Problem List    Diagnosis Date Noted   • Echogenic focus of heart of fetus affecting antepartum care of mother 2020   • Supervision of normal pregnancy in second trimester 2020         Tdap: declines  PP BCM: BTL    Growth US pending related to low weight gain in pregnancy. No weight change in 6 weeks.     - S/sx pregnancy and labor warning signs vs general discomforts discussed  - Fetal movements and kick counts reviewed. Adequate hydration reinforced.  - Did discuss current COVID policies related to outpatient and inpatient visits.   - Anticipatory guidance provided.   - RTC in 2 weeks for routine prenatal care.

## 2021-01-21 ENCOUNTER — APPOINTMENT (OUTPATIENT)
Dept: RADIOLOGY | Facility: IMAGING CENTER | Age: 36
End: 2021-01-21
Attending: ADVANCED PRACTICE MIDWIFE
Payer: MEDICAID

## 2021-01-21 DIAGNOSIS — O26.13 LOW WEIGHT GAIN DURING PREGNANCY IN THIRD TRIMESTER: ICD-10-CM

## 2021-01-21 PROCEDURE — 76816 OB US FOLLOW-UP PER FETUS: CPT | Mod: TC | Performed by: ADVANCED PRACTICE MIDWIFE

## 2021-01-28 ENCOUNTER — ROUTINE PRENATAL (OUTPATIENT)
Dept: OBGYN | Facility: CLINIC | Age: 36
End: 2021-01-28
Payer: MEDICAID

## 2021-01-28 VITALS — DIASTOLIC BLOOD PRESSURE: 70 MMHG | WEIGHT: 162 LBS | SYSTOLIC BLOOD PRESSURE: 110 MMHG | BODY MASS INDEX: 32.72 KG/M2

## 2021-01-28 DIAGNOSIS — Z34.83 ENCOUNTER FOR SUPERVISION OF OTHER NORMAL PREGNANCY IN THIRD TRIMESTER: Primary | ICD-10-CM

## 2021-01-28 PROCEDURE — 90040 PR PRENATAL FOLLOW UP: CPT | Performed by: NURSE PRACTITIONER

## 2021-01-28 NOTE — PROGRESS NOTES
S:  Pt is  at 32w3d for routine OB follow up.  Reports rash on inner aspect of LT forearm. No ED or hospital visits since last seen. Reports good FM.  Denies VB, LOF, RUCs or vaginal DC.    O:  Please see above vitals.        FHTs: 145        Fundal ht: 32 cm.        S=D        Pertinent US: 21: Estimated Fetal Weight:  1679 grams EFW Percentile: 25.8%-reviewed w pt    A:  IUP at 32w3d  Patient Active Problem List    Diagnosis Date Noted   • Echogenic focus of heart of fetus affecting antepartum care of mother 2020   • Supervision of normal pregnancy in second trimester 2020        P:  1.  GBS @ 36 wks.          2.  Continue FKCs.          3.  Questions answered.          4.  Encouraged pt to tour L&D.          5.  Encourage adequate water intake.        6.  F/u 2 wks.        7.  Discussed avoidance of lotions, soaps with heavy fragrances, wash all new clothes prior to wearing. Aveeno or Eucerin lotion. May use Benadryl cream for itching and inflammation..

## 2021-01-28 NOTE — PROGRESS NOTES
OB follow up   + fetal movement. Active  No VB, LOF or UC's.  Flu vaccine offered Received   Phone # [phone]  Preferred pharmacy confirmed.  No complaints as of today

## 2021-01-29 ENCOUNTER — TELEPHONE (OUTPATIENT)
Dept: OBGYN | Facility: CLINIC | Age: 36
End: 2021-01-29

## 2021-01-29 NOTE — TELEPHONE ENCOUNTER
[facility] lab called to notify us that the pap specimen that was sent yesterday had no order.    I notified Vianca MCWILLIAMS at [facility], she stated she will take care of it.

## 2021-02-11 ENCOUNTER — ROUTINE PRENATAL (OUTPATIENT)
Dept: OBGYN | Facility: CLINIC | Age: 36
End: 2021-02-11
Payer: MEDICAID

## 2021-02-11 VITALS — WEIGHT: 162 LBS | SYSTOLIC BLOOD PRESSURE: 106 MMHG | DIASTOLIC BLOOD PRESSURE: 60 MMHG | BODY MASS INDEX: 32.72 KG/M2

## 2021-02-11 DIAGNOSIS — Z34.82 ENCOUNTER FOR SUPERVISION OF OTHER NORMAL PREGNANCY IN SECOND TRIMESTER: ICD-10-CM

## 2021-02-11 PROCEDURE — 90040 PR PRENATAL FOLLOW UP: CPT | Performed by: PHYSICIAN ASSISTANT

## 2021-02-11 NOTE — PROGRESS NOTES
Pt has no complaints with cramping, UCs, VB, LOF, though pt has occ cramping at night only. +FM. GBS next visit. PTL precautions reviewed. Daily FKC recommended. RTC 2 wk or sooner prn.

## 2021-02-11 NOTE — PROGRESS NOTES
OB follow up   + fetal movement.  No VB, LOF or UC's.  Flu vaccine current  Tdap declined  Pt states she has no concerns at this time  [phone] (home)    Preferred pharmacy confirmed.

## 2021-02-25 ENCOUNTER — ROUTINE PRENATAL (OUTPATIENT)
Dept: OBGYN | Facility: CLINIC | Age: 36
End: 2021-02-25
Payer: MEDICAID

## 2021-02-25 VITALS — WEIGHT: 163 LBS | SYSTOLIC BLOOD PRESSURE: 116 MMHG | BODY MASS INDEX: 32.92 KG/M2 | DIASTOLIC BLOOD PRESSURE: 68 MMHG

## 2021-02-25 DIAGNOSIS — Z3A.36 36 WEEKS GESTATION OF PREGNANCY: ICD-10-CM

## 2021-02-25 PROCEDURE — 90040 PR PRENATAL FOLLOW UP: CPT | Performed by: NURSE PRACTITIONER

## 2021-02-25 ASSESSMENT — EDINBURGH POSTNATAL DEPRESSION SCALE (EPDS)
I HAVE BEEN SO UNHAPPY THAT I HAVE BEEN CRYING: NO, NEVER
I HAVE BEEN ABLE TO LAUGH AND SEE THE FUNNY SIDE OF THINGS: AS MUCH AS I ALWAYS COULD
I HAVE BLAMED MYSELF UNNECESSARILY WHEN THINGS WENT WRONG: NOT VERY OFTEN
I HAVE BEEN SO UNHAPPY THAT I HAVE HAD DIFFICULTY SLEEPING: NOT VERY OFTEN
THE THOUGHT OF HARMING MYSELF HAS OCCURRED TO ME: NEVER
I HAVE FELT SCARED OR PANICKY FOR NO GOOD REASON: NO, NOT MUCH
TOTAL SCORE: 6
THINGS HAVE BEEN GETTING ON TOP OF ME: NO, MOST OF THE TIME I HAVE COPED QUITE WELL
I HAVE FELT SAD OR MISERABLE: NOT VERY OFTEN
I HAVE BEEN ANXIOUS OR WORRIED FOR NO GOOD REASON: HARDLY EVER
I HAVE LOOKED FORWARD WITH ENJOYMENT TO THINGS: AS MUCH AS I EVER DID

## 2021-02-25 NOTE — PROGRESS NOTES
OB follow up   + fetal movement.  No VB, LOF or UC's.  Pt states she has no concerns at this time  Flu vaccine current  Tdap declined  GBS Today  [phone] (home)    Preferred pharmacy confirmed.

## 2021-02-25 NOTE — PROGRESS NOTES
SUBJECTIVE:  Pt is a 35 y.o.   at 36w3d  gestation. Presents today for follow-up prenatal care. Reports no issues at this time.  Reports good fetal movement. Denies regular cramping/contractions, bleeding or leaking of fluid. Denies dysuria, headaches, N/V. Generally feels well today.      OBJECTIVE:  - See prenatal vitals flow  -   Vitals:    21 1351   BP: 116/68   Weight: 73.9 kg (163 lb)                 ASSESSMENT:   - IUP at 36w3d    - S=D   -   Patient Active Problem List    Diagnosis Date Noted   • Echogenic focus of heart of fetus affecting antepartum care of mother 2020   • Supervision of normal pregnancy in third trimester 2020         PLAN:  - S/sx pregnancy and labor warning signs vs general discomforts discussed  - Fetal movements and/or kick counts reviewed   - Adequate hydration reinforced  - Nutrition/exercise/vitamin education; continue PNV  - Plans for formulafeeding  - Plans depo and  will plan on a vasectomy soon and he has insurance   - S/p Flu vacc   - GBS collected along with EPDS screening   - Anticipatory guidance given  - RTC in 1 weeks for follow-up prenatal care

## 2021-03-03 DIAGNOSIS — Z3A.36 36 WEEKS GESTATION OF PREGNANCY: ICD-10-CM

## 2021-03-04 ENCOUNTER — ROUTINE PRENATAL (OUTPATIENT)
Dept: OBGYN | Facility: CLINIC | Age: 36
End: 2021-03-04
Payer: MEDICAID

## 2021-03-04 VITALS — DIASTOLIC BLOOD PRESSURE: 58 MMHG | BODY MASS INDEX: 32.92 KG/M2 | SYSTOLIC BLOOD PRESSURE: 114 MMHG | WEIGHT: 163 LBS

## 2021-03-04 DIAGNOSIS — Z34.82 ENCOUNTER FOR SUPERVISION OF OTHER NORMAL PREGNANCY IN SECOND TRIMESTER: ICD-10-CM

## 2021-03-04 PROCEDURE — 90040 PR PRENATAL FOLLOW UP: CPT | Performed by: PHYSICIAN ASSISTANT

## 2021-03-04 NOTE — PROGRESS NOTES
Pt here today for OB follow up  Negative GBS, pt aware  Reports +FM  WT: 163 lb  BP: 114/58  Pt states no complaints pr concerns today  Preferred pharmacy verified with pt.  Good # [phone]

## 2021-03-04 NOTE — PROGRESS NOTES
Pt has no complaints with cramping, UCs, Vb, LOF. +FM. GBS neg - pt notified of results. Labor precautions reviewed. Will consider scheduling IOL at 41wk if not in labor by next visit. Daily FKC and walks recommended. RTC 1 wk or sooner prn.

## 2021-03-11 ENCOUNTER — ROUTINE PRENATAL (OUTPATIENT)
Dept: OBGYN | Facility: CLINIC | Age: 36
End: 2021-03-11
Payer: MEDICAID

## 2021-03-11 VITALS — WEIGHT: 164 LBS | BODY MASS INDEX: 33.12 KG/M2 | SYSTOLIC BLOOD PRESSURE: 116 MMHG | DIASTOLIC BLOOD PRESSURE: 70 MMHG

## 2021-03-11 DIAGNOSIS — Z3A.38 38 WEEKS GESTATION OF PREGNANCY: ICD-10-CM

## 2021-03-11 PROCEDURE — 90040 PR PRENATAL FOLLOW UP: CPT | Performed by: NURSE PRACTITIONER

## 2021-03-11 NOTE — PROGRESS NOTES
OB follow up   + fetal movement.  No VB, LOF or UC's.  Pt states she has no concerns at this time  Flu vaccine current Tdap declined  GBS Negative  [phone] (home)    Preferred pharmacy confirmed.

## 2021-03-11 NOTE — PROGRESS NOTES
SUBJECTIVE:  Pt is a 35 y.o.   at 38w3d  gestation. Presents today for follow-up prenatal care. Reports no issues at this time.  Reports good  fetal movement. Denies regular cramping/contractions, bleeding or leaking of fluid. Denies dysuria, headaches, N/V. Generally feels well today.     OBJECTIVE:  - See prenatal vitals flow  -   Vitals:    21 1539   BP: 116/70   Weight: 74.4 kg (164 lb)                 ASSESSMENT:   - IUP at 38w3d    - S=D   -   Patient Active Problem List    Diagnosis Date Noted   • Echogenic focus of heart of fetus affecting antepartum care of mother 2020   • Supervision of normal pregnancy in third trimester 2020         PLAN:  - S/sx pregnancy and labor warning signs vs general discomforts discussed  - Fetal movements and/or kick counts reviewed   - Adequate hydration reinforced  - Nutrition/exercise/vitamin education; continue PNV  - S/p Flu vacc   - IOL placed for 41 weeks   - Anticipatory guidance given  - RTC in 1 weeks for follow-up prenatal care

## 2021-03-18 ENCOUNTER — ROUTINE PRENATAL (OUTPATIENT)
Dept: OBGYN | Facility: CLINIC | Age: 36
End: 2021-03-18
Payer: MEDICAID

## 2021-03-18 VITALS — BODY MASS INDEX: 32.92 KG/M2 | DIASTOLIC BLOOD PRESSURE: 68 MMHG | SYSTOLIC BLOOD PRESSURE: 116 MMHG | WEIGHT: 163 LBS

## 2021-03-18 DIAGNOSIS — Z34.83 ENCOUNTER FOR SUPERVISION OF OTHER NORMAL PREGNANCY IN THIRD TRIMESTER: Primary | ICD-10-CM

## 2021-03-18 DIAGNOSIS — Z34.81 ENCOUNTER FOR SUPERVISION OF OTHER NORMAL PREGNANCY IN FIRST TRIMESTER: ICD-10-CM

## 2021-03-18 DIAGNOSIS — O36.8190 DECREASED FETAL MOVEMENT DURING PREGNANCY, ANTEPARTUM, SINGLE OR UNSPECIFIED FETUS: ICD-10-CM

## 2021-03-18 PROCEDURE — 90040 PR PRENATAL FOLLOW UP: CPT | Performed by: NURSE PRACTITIONER

## 2021-03-18 NOTE — PROGRESS NOTES
S) Pt is a 35 y.o.   at 39w3d  gestation. Routine prenatal care today. Pt states fetal movement counts taking longer on some days and very fast on others.  .    Fetal movement Abnormal - slower than normal  Cramping no  VB no  LOF no   Denies dysuria. Generally feels well today. Good self-care activities identified. Denies headaches, swelling, visual changes, or epigastric pain .     O) See flow sheet for vital signs and fetal measurements.          Labs:       PNL: WNL       GCT: 101       AFP: normal       GBS: negative       Pertinent ultrasound - 20 anatomy WNL with exception of left EIF, c/w previous dating  21 growth 25.8%            A) IUP at 39w3d       S=D         Patient Active Problem List    Diagnosis Date Noted   • Echogenic focus of heart of fetus affecting antepartum care of mother 2020   • Supervision of normal pregnancy in third trimester 2020          SVE: 50/-3         TDAP: no       FLU: yes        BTL: no       : no       C/S Consent: no       IOL or C/S scheduled: yes - 3/29       LAST PAP: 20 negative         P)labour precautions. Fetal movements reviewed. General ed and anticipatory guidance. Nutrition/exercise/vitamin. Plans breast Plans pp contraception- unsure  Continue PNV.   NST now.  Discussed how movements can change at end of pregnancy however important to get 10/2 hours and if not, go to L&D  Reviewed IOL information  RTC 1 week or PRN.

## 2021-03-18 NOTE — PROGRESS NOTES
Pt here today for OB follow up  Negative GBS, pt aware  Pt states baby is now taking 2 hours to complete the 10 fetal movements, before it was taking baby 1.5 hour to complete the 10 movements.   WT: 163 lb  BP: 116/68  Preferred pharmacy verified with pt.   Pt states she has been feeling pressure pain at night. States no other complaints.   OP GELScheduled for Mon 03/29/21 @ 9:00 PM, IOL on Tues 03/30/21 @ 10:00 AM. Pt notified and instructions given today.  Good # [phone]

## 2021-03-24 ENCOUNTER — HOSPITAL ENCOUNTER (INPATIENT)
Facility: MEDICAL CENTER | Age: 36
LOS: 1 days | DRG: 769 | End: 2021-03-25
Attending: OBSTETRICS & GYNECOLOGY | Admitting: OBSTETRICS & GYNECOLOGY
Payer: MEDICAID

## 2021-03-24 LAB
BASOPHILS # BLD AUTO: 0.1 % (ref 0–1.8)
BASOPHILS # BLD: 0.02 K/UL (ref 0–0.12)
EOSINOPHIL # BLD AUTO: 0.03 K/UL (ref 0–0.51)
EOSINOPHIL NFR BLD: 0.2 % (ref 0–6.9)
ERYTHROCYTE [DISTWIDTH] IN BLOOD BY AUTOMATED COUNT: 40 FL (ref 35.9–50)
ERYTHROCYTE [DISTWIDTH] IN BLOOD BY AUTOMATED COUNT: 40.4 FL (ref 35.9–50)
HCT VFR BLD AUTO: 38.7 % (ref 37–47)
HCT VFR BLD AUTO: 40.5 % (ref 37–47)
HGB BLD-MCNC: 13.2 G/DL (ref 12–16)
HGB BLD-MCNC: 13.7 G/DL (ref 12–16)
HOLDING TUBE BB 8507: NORMAL
IMM GRANULOCYTES # BLD AUTO: 0.06 K/UL (ref 0–0.11)
IMM GRANULOCYTES NFR BLD AUTO: 0.4 % (ref 0–0.9)
LYMPHOCYTES # BLD AUTO: 1.55 K/UL (ref 1–4.8)
LYMPHOCYTES NFR BLD: 11.5 % (ref 22–41)
MCH RBC QN AUTO: 28.8 PG (ref 27–33)
MCH RBC QN AUTO: 29.1 PG (ref 27–33)
MCHC RBC AUTO-ENTMCNC: 33.8 G/DL (ref 33.6–35)
MCHC RBC AUTO-ENTMCNC: 34.1 G/DL (ref 33.6–35)
MCV RBC AUTO: 85.2 FL (ref 81.4–97.8)
MCV RBC AUTO: 85.3 FL (ref 81.4–97.8)
MONOCYTES # BLD AUTO: 0.45 K/UL (ref 0–0.85)
MONOCYTES NFR BLD AUTO: 3.4 % (ref 0–13.4)
NEUTROPHILS # BLD AUTO: 11.31 K/UL (ref 2–7.15)
NEUTROPHILS NFR BLD: 84.4 % (ref 44–72)
NRBC # BLD AUTO: 0 K/UL
NRBC BLD-RTO: 0 /100 WBC
PATHOLOGY CONSULT NOTE: NORMAL
PLATELET # BLD AUTO: 242 K/UL (ref 164–446)
PLATELET # BLD AUTO: 270 K/UL (ref 164–446)
PMV BLD AUTO: 9.5 FL (ref 9–12.9)
PMV BLD AUTO: 9.6 FL (ref 9–12.9)
RBC # BLD AUTO: 4.54 M/UL (ref 4.2–5.4)
RBC # BLD AUTO: 4.75 M/UL (ref 4.2–5.4)
SARS-COV+SARS-COV-2 AG RESP QL IA.RAPID: NOTDETECTED
SARS-COV-2 RNA RESP QL NAA+PROBE: NOTDETECTED
SPECIMEN SOURCE: NORMAL
SPECIMEN SOURCE: NORMAL
WBC # BLD AUTO: 13 K/UL (ref 4.8–10.8)
WBC # BLD AUTO: 13.4 K/UL (ref 4.8–10.8)

## 2021-03-24 PROCEDURE — 770002 HCHG ROOM/CARE - OB PRIVATE (112)

## 2021-03-24 PROCEDURE — 303615 HCHG EPIDURAL/SPINAL ANESTHESIA FOR LABOR

## 2021-03-24 PROCEDURE — 59409 OBSTETRICAL CARE: CPT

## 2021-03-24 PROCEDURE — 85027 COMPLETE CBC AUTOMATED: CPT

## 2021-03-24 PROCEDURE — 36415 COLL VENOUS BLD VENIPUNCTURE: CPT

## 2021-03-24 PROCEDURE — 87426 SARSCOV CORONAVIRUS AG IA: CPT

## 2021-03-24 PROCEDURE — 304965 HCHG RECOVERY SERVICES

## 2021-03-24 PROCEDURE — A9270 NON-COVERED ITEM OR SERVICE: HCPCS | Performed by: OBSTETRICS & GYNECOLOGY

## 2021-03-24 PROCEDURE — A9270 NON-COVERED ITEM OR SERVICE: HCPCS | Performed by: NURSE PRACTITIONER

## 2021-03-24 PROCEDURE — 88307 TISSUE EXAM BY PATHOLOGIST: CPT

## 2021-03-24 PROCEDURE — 700102 HCHG RX REV CODE 250 W/ 637 OVERRIDE(OP): Performed by: NURSE PRACTITIONER

## 2021-03-24 PROCEDURE — U0005 INFEC AGEN DETEC AMPLI PROBE: HCPCS

## 2021-03-24 PROCEDURE — 85025 COMPLETE CBC W/AUTO DIFF WBC: CPT

## 2021-03-24 PROCEDURE — 700102 HCHG RX REV CODE 250 W/ 637 OVERRIDE(OP): Performed by: OBSTETRICS & GYNECOLOGY

## 2021-03-24 PROCEDURE — U0003 INFECTIOUS AGENT DETECTION BY NUCLEIC ACID (DNA OR RNA); SEVERE ACUTE RESPIRATORY SYNDROME CORONAVIRUS 2 (SARS-COV-2) (CORONAVIRUS DISEASE [COVID-19]), AMPLIFIED PROBE TECHNIQUE, MAKING USE OF HIGH THROUGHPUT TECHNOLOGIES AS DESCRIBED BY CMS-2020-01-R: HCPCS

## 2021-03-24 PROCEDURE — 59414 DELIVER PLACENTA: CPT | Performed by: OBSTETRICS & GYNECOLOGY

## 2021-03-24 PROCEDURE — 10D17ZZ EXTRACTION OF PRODUCTS OF CONCEPTION, RETAINED, VIA NATURAL OR ARTIFICIAL OPENING: ICD-10-PCS | Performed by: OBSTETRICS & GYNECOLOGY

## 2021-03-24 RX ORDER — OXYCODONE HYDROCHLORIDE AND ACETAMINOPHEN 5; 325 MG/1; MG/1
1 TABLET ORAL EVERY 4 HOURS PRN
Status: DISCONTINUED | OUTPATIENT
Start: 2021-03-24 | End: 2021-03-25 | Stop reason: HOSPADM

## 2021-03-24 RX ORDER — SODIUM CHLORIDE, SODIUM LACTATE, POTASSIUM CHLORIDE, CALCIUM CHLORIDE 600; 310; 30; 20 MG/100ML; MG/100ML; MG/100ML; MG/100ML
INJECTION, SOLUTION INTRAVENOUS PRN
Status: DISCONTINUED | OUTPATIENT
Start: 2021-03-24 | End: 2021-03-25 | Stop reason: HOSPADM

## 2021-03-24 RX ORDER — DOCUSATE SODIUM 100 MG/1
100 CAPSULE, LIQUID FILLED ORAL 2 TIMES DAILY PRN
Status: DISCONTINUED | OUTPATIENT
Start: 2021-03-24 | End: 2021-03-25 | Stop reason: HOSPADM

## 2021-03-24 RX ORDER — LIDOCAINE HYDROCHLORIDE 10 MG/ML
INJECTION, SOLUTION INFILTRATION; PERINEURAL
Status: ACTIVE
Start: 2021-03-24 | End: 2021-03-24

## 2021-03-24 RX ORDER — IBUPROFEN 600 MG/1
600 TABLET ORAL EVERY 6 HOURS PRN
Status: DISCONTINUED | OUTPATIENT
Start: 2021-03-24 | End: 2021-03-25 | Stop reason: HOSPADM

## 2021-03-24 RX ORDER — OXYTOCIN 10 [USP'U]/ML
INJECTION, SOLUTION INTRAMUSCULAR; INTRAVENOUS
Status: ACTIVE
Start: 2021-03-24 | End: 2021-03-24

## 2021-03-24 RX ADMIN — OXYCODONE HYDROCHLORIDE AND ACETAMINOPHEN 1 TABLET: 5; 325 TABLET ORAL at 18:03

## 2021-03-24 RX ADMIN — IBUPROFEN 600 MG: 600 TABLET ORAL at 11:54

## 2021-03-24 RX ADMIN — OXYCODONE HYDROCHLORIDE AND ACETAMINOPHEN 1 TABLET: 5; 325 TABLET ORAL at 05:06

## 2021-03-24 RX ADMIN — IBUPROFEN 600 MG: 600 TABLET ORAL at 18:03

## 2021-03-24 ASSESSMENT — PAIN DESCRIPTION - PAIN TYPE
TYPE: ACUTE PAIN

## 2021-03-24 ASSESSMENT — EDINBURGH POSTNATAL DEPRESSION SCALE (EPDS)
I HAVE FELT SAD OR MISERABLE: NO, NOT AT ALL
I HAVE LOOKED FORWARD WITH ENJOYMENT TO THINGS: AS MUCH AS I EVER DID
I HAVE BEEN ANXIOUS OR WORRIED FOR NO GOOD REASON: NO, NOT AT ALL
I HAVE BEEN SO UNHAPPY THAT I HAVE BEEN CRYING: NO, NEVER
I HAVE BLAMED MYSELF UNNECESSARILY WHEN THINGS WENT WRONG: NO, NEVER
THE THOUGHT OF HARMING MYSELF HAS OCCURRED TO ME: NEVER
I HAVE FELT SCARED OR PANICKY FOR NO GOOD REASON: NO, NOT MUCH
THINGS HAVE BEEN GETTING ON TOP OF ME: NO, MOST OF THE TIME I HAVE COPED QUITE WELL
I HAVE BEEN ABLE TO LAUGH AND SEE THE FUNNY SIDE OF THINGS: AS MUCH AS I ALWAYS COULD
I HAVE BEEN SO UNHAPPY THAT I HAVE HAD DIFFICULTY SLEEPING: NOT AT ALL

## 2021-03-24 ASSESSMENT — COPD QUESTIONNAIRES
DURING THE PAST 4 WEEKS HOW MUCH DID YOU FEEL SHORT OF BREATH: NONE/LITTLE OF THE TIME
IN THE PAST 12 MONTHS DO YOU DO LESS THAN YOU USED TO BECAUSE OF YOUR BREATHING PROBLEMS: DISAGREE/UNSURE
COPD SCREENING SCORE: 0
HAVE YOU SMOKED AT LEAST 100 CIGARETTES IN YOUR ENTIRE LIFE: NO/DON'T KNOW
DO YOU EVER COUGH UP ANY MUCUS OR PHLEGM?: NO/ONLY WITH OCCASIONAL COLDS OR INFECTIONS

## 2021-03-24 ASSESSMENT — PATIENT HEALTH QUESTIONNAIRE - PHQ9
SUM OF ALL RESPONSES TO PHQ9 QUESTIONS 1 AND 2: 0
1. LITTLE INTEREST OR PLEASURE IN DOING THINGS: NOT AT ALL
2. FEELING DOWN, DEPRESSED, IRRITABLE, OR HOPELESS: NOT AT ALL

## 2021-03-24 NOTE — PROGRESS NOTES
RN called to emergency room for pt that is delivering in her car in the parking lot. Upon arrival of the scene, baby delivered at 0418, cord clamped and cut, given to transition RN immediately in warm blankets. This pt was transferred onto a gurney in stable condition into S2 L&D. Dr Banuelos notified of pt's arrival. Placenta delivered by MD, see delivery summary. Pt denies complications with this pregnancy or prior pregnancies. Infant brought to mother in stable condition, skin to skin. VSS. Bleeding light to scant. Per MD, this pt does not need an IV at this time. Pt encouraged to call out regarding any questions/ needs. Will continue to closely monitor.

## 2021-03-24 NOTE — PROGRESS NOTES
0610 Patient ambulated to restroom to void, patient able to void, tolerated well; pericare provided.     0620 Patient transferred to Postpartum unit with baby in arms, both in stable condition; bedside report given to Frances RN, plan of care discussed; vital signs stable; cuddles and bands verified with both RNs at bedside.

## 2021-03-24 NOTE — H&P
History and Physical    Ana Hernandez is a 35 y.o. female  at 40w2d who presents after precipitous delivery in the car.    Subjective:   CTXs: negative   Pain: negative  LOF: negative  Vaginal bleeding: positive   Fetal movement: negative (delivered)    ROS: Pertinent positives documented in HPI and all other systems reviewed & are negative    OB History    Para Term  AB Living   5 4 4     4   SAB TAB Ectopic Molar Multiple Live Births           0 4      # Outcome Date GA Lbr Arnaud/2nd Weight Sex Delivery Anes PTL Lv   5 Current            4 Term 19 39w0d 15:01 / 00:31 3.29 kg (7 lb 4.1 oz) M Vag-Spont None N NAIN   3 Term 13 41w0d  3.685 kg (8 lb 2 oz) M Vag-Spont None N NAIN      Birth Comments: Pt states delivery wthout complications.   2 Term 07 38w0d  3.345 kg (7 lb 6 oz) M Vag-Spont None N NAIN      Birth Comments: Pt states no complications   1 Term 05 38w0d  3.345 kg (7 lb 6 oz) M Vag-Spont EPI N NAIN      Birth Comments: Pt states no complications       Past Medical History:   Diagnosis Date   • Thyroid disease        No past surgical history on file.      Current Facility-Administered Medications:   •  LIDOCAINE HCL 1 % INJ SOLN, , , ,   •  OXYTOCIN 10 UNIT/ML INJ SOLN, , , ,   •  tetanus-dipth-acell pertussis (Tdap) inj 0.5 mL, 0.5 mL, Intramuscular, Once PRN, Chantel Vin, D.O.  •  measles, mumps and rubella vaccine (MMR) injection 0.5 mL, 0.5 mL, Subcutaneous, Once PRN, Chantel Vin, D.O.    Allergies: Patient has no known allergies.    Social History     Socioeconomic History   • Marital status:      Spouse name: Not on file   • Number of children: Not on file   • Years of education: Not on file   • Highest education level: Not on file   Occupational History   • Not on file   Tobacco Use   • Smoking status: Never Smoker   • Smokeless tobacco: Never Used   Substance and Sexual Activity   • Alcohol use: Not Currently     Comment: Occassionally,  last had a drink 10/26/2012   • Drug use: No   • Sexual activity: Yes     Partners: Male     Birth control/protection: Pill   Other Topics Concern   • Not on file   Social History Narrative   • Not on file     Social Determinants of Health     Financial Resource Strain:    • Difficulty of Paying Living Expenses:    Food Insecurity:    • Worried About Running Out of Food in the Last Year:    • Ran Out of Food in the Last Year:    Transportation Needs:    • Lack of Transportation (Medical):    • Lack of Transportation (Non-Medical):    Physical Activity:    • Days of Exercise per Week:    • Minutes of Exercise per Session:    Stress:    • Feeling of Stress :    Social Connections:    • Frequency of Communication with Friends and Family:    • Frequency of Social Gatherings with Friends and Family:    • Attends [employer] Services:    • Active Member of Clubs or Organizations:    • Attends Club or Organization Meetings:    • Marital Status:    Intimate Partner Violence:    • Fear of Current or Ex-Partner:    • Emotionally Abused:    • Physically Abused:    • Sexually Abused:      Prenatal care with [facility] with following problems:  Patient Active Problem List    Diagnosis Date Noted   • Echogenic focus of heart of fetus affecting antepartum care of mother 11/13/2020   • Supervision of normal pregnancy in third trimester 09/11/2020       Objective:      Wt 73.9 kg (163 lb)     General:   no acute distress, alert   Skin:   normal   HEENT:  PERRLA   Lungs:   CTA bilateral   Abdomen:   soft, gravid, NT   Pelvis:  adequate with gynecoid pelvis   FHT: Unable to obtain fetal monitoring   Uterine Size: At 20 weeks     Lab Review  Lab:   Blood type: O     Recent Results (from the past 5880 hour(s))   POCT Pregnancy    Collection Time: 08/26/20  3:23 PM   Result Value Ref Range    POC Urine Pregnancy Test positive Negative    Internal Control Positive Positive     Internal Control Negative Negative    POCT Urinalysis    Collection  Time: 09/11/20  1:18 PM   Result Value Ref Range    POC Color      POC Appearance      POC Leukocyte Esterase Negative Negative    POC Nitrites Negative Negative    POC Urobiligen      POC Protein Trace Negative mg/dL    POC Urine PH 7.0 5.0 - 8.0    POC Blood Negative Negative    POC Specific Gravity 1.020 <1.005 - >1.030    POC Ketones Trace Negative mg/dL    POC Bilirubin      POC Glucose Negative Negative mg/dL        Assessment:   Ana Hernandez at 40w2d  Labor status: s/p precipitous delivery in the field  Obstetrical history significant for   Patient Active Problem List    Diagnosis Date Noted   • Echogenic focus of heart of fetus affecting antepartum care of mother 11/13/2020   • Supervision of normal pregnancy in third trimester 09/11/2020   .      Plan:     Admit to postpartum  GBS negative  See placental delivery note.

## 2021-03-24 NOTE — PROGRESS NOTES
Assumed care from L&D. [place] patient to room, call light, emergency light, TV, bed remote. Assessment completed.  Plan of care reviewed; verbalized understanding. Reports pain to be tolerable at this time. Instructed to call if needed pain medication.

## 2021-03-24 NOTE — CARE PLAN
Problem: Communication  Goal: The ability to communicate needs accurately and effectively will improve  Outcome: PROGRESSING AS EXPECTED     Problem: Safety  Goal: Will remain free from injury  Outcome: PROGRESSING AS EXPECTED  Goal: Will remain free from falls  Outcome: PROGRESSING AS EXPECTED     Problem: Altered physiologic condition related to immediate post-delivery state and potential for bleeding/hemorrhage  Goal: Patient physiologically stable as evidenced by normal lochia, palpable uterine involution and vital signs within normal limits  Outcome: PROGRESSING AS EXPECTED     Problem: Potential for postpartum infection related to presence of episiotomy/vaginal tear and/or uterine contamination  Goal: Patient will be absent from signs and symptoms of infection  Outcome: PROGRESSING AS EXPECTED     Problem: Alteration in comfort related to episiotomy, vaginal repair and/or after birth pains  Goal: Patient is able to ambulate, care for self and infant  Outcome: PROGRESSING AS EXPECTED  Goal: Patient verbalizes acceptable pain level  Outcome: PROGRESSING AS EXPECTED

## 2021-03-24 NOTE — L&D DELIVERY NOTE
Normal Spontaneous Vaginal Delivery    Date of procedure: 3/24/2021  PreOp Dx: s/p delivery in the field. Retained placenta  PostOp Dx: Same with delivery of placenta.   Procedure: placental delivery  Surgeon: Chantel Banuelos DO  Assistants: none  Anesthesia: none  EBL: 25 cc  Indications: This is a 35 y.o.  at 40w2d weeks by LMP with an EDC of Estimated Date of Delivery: 3/22/21. Her prenatal course was complicated by multiparity.   Labor course: she presented after spontaneous onset of labor and delivered the infant in the car. She was brought up to labor and delivery for further evaluation.     Procedure:   The patient placed in dorsal lithotomy position. The placenta delivered intact spontaneously and the uterus was evacuated of clots. Pitocin was given IM due to lack of IV access however bleeding was very minimal. Examination of cervix and vaginal vault revealed no laceration.     The patient tolerated this procedure well and recovered in L&D with her infant. All sponge, needle, and instrument counts were correct.         -------------------------------------------------------------------------------------------------------------  Chantel Banuelos D.O.

## 2021-03-25 VITALS
BODY MASS INDEX: 32.86 KG/M2 | RESPIRATION RATE: 18 BRPM | OXYGEN SATURATION: 97 % | WEIGHT: 163 LBS | SYSTOLIC BLOOD PRESSURE: 109 MMHG | HEIGHT: 59 IN | TEMPERATURE: 98 F | DIASTOLIC BLOOD PRESSURE: 62 MMHG | HEART RATE: 80 BPM

## 2021-03-25 PROCEDURE — 700111 HCHG RX REV CODE 636 W/ 250 OVERRIDE (IP): Performed by: STUDENT IN AN ORGANIZED HEALTH CARE EDUCATION/TRAINING PROGRAM

## 2021-03-25 PROCEDURE — 700102 HCHG RX REV CODE 250 W/ 637 OVERRIDE(OP): Performed by: NURSE PRACTITIONER

## 2021-03-25 PROCEDURE — A9270 NON-COVERED ITEM OR SERVICE: HCPCS | Performed by: NURSE PRACTITIONER

## 2021-03-25 RX ORDER — PSEUDOEPHEDRINE HCL 30 MG
100 TABLET ORAL 2 TIMES DAILY PRN
Qty: 120 CAPSULE | Refills: 0 | Status: SHIPPED | OUTPATIENT
Start: 2021-03-25 | End: 2021-05-24

## 2021-03-25 RX ORDER — IBUPROFEN 600 MG/1
600 TABLET ORAL EVERY 6 HOURS PRN
Qty: 25 TABLET | Refills: 0 | Status: SHIPPED | OUTPATIENT
Start: 2021-03-25 | End: 2021-04-01

## 2021-03-25 RX ORDER — MEDROXYPROGESTERONE ACETATE 150 MG/ML
150 INJECTION, SUSPENSION INTRAMUSCULAR ONCE
Status: COMPLETED | OUTPATIENT
Start: 2021-03-25 | End: 2021-03-25

## 2021-03-25 RX ADMIN — MEDROXYPROGESTERONE ACETATE 150 MG: 150 INJECTION, SUSPENSION, EXTENDED RELEASE INTRAMUSCULAR at 07:57

## 2021-03-25 RX ADMIN — IBUPROFEN 600 MG: 600 TABLET ORAL at 08:04

## 2021-03-25 ASSESSMENT — PAIN DESCRIPTION - PAIN TYPE: TYPE: ACUTE PAIN

## 2021-03-25 NOTE — PROGRESS NOTES
Patient awake and interacting with infant in room. Fundus firm, lochia light. Reports 2/10  abdominal pain. Heat pack applied for back discomfort.Tolerating regular diet. Plan of care reviewed including: postpartum and infant care, vitals frequency, and pain management. Verbalized understanding and agrees. Able to make needs known. Significant other at bedside attentive with care.     Feeding plan: bottle feeding with similac only.

## 2021-03-25 NOTE — CARE PLAN
Problem: Altered physiologic condition related to immediate post-delivery state and potential for bleeding/hemorrhage  Goal: Patient physiologically stable as evidenced by normal lochia, palpable uterine involution and vital signs within normal limits  Outcome: PROGRESSING AS EXPECTED  Intervention: Perform physical assessment and obtain vital signs on patient as directed in Intrapartum/Postpartum Standard of Care in Policy and Procedure manual  Note: Fundus firm, lochia light. Vitals WNL.      Problem: Potential knowledge deficit related to lack of understanding of self and  care  Goal: Patient will verbalize understanding of self and infant care  Outcome: PROGRESSING AS EXPECTED  Note: Bonding with infant in room appropriately. Bottle feeding.

## 2021-03-25 NOTE — DISCHARGE INSTRUCTIONS

## 2021-03-25 NOTE — DISCHARGE SUMMARY
Discharge Summary:     Date of Admission: 3/24/2021  Date of Discharge: 21      Admitting diagnosis:    1. Pregnancy @ 40w2d      Discharge Diagnosis:   1. Status post vaginal, spontaneous. Car delivery, placenta delivered in hospital     Pregnancy Complications: none  Tubal Ligation:  no    Past Medical History:   Diagnosis Date   • Thyroid disease      OB History    Para Term  AB Living   5 5 5     5   SAB TAB Ectopic Molar Multiple Live Births           0 5      # Outcome Date GA Lbr Arnaud/2nd Weight Sex Delivery Anes PTL Lv   5 Term 21 40w2d  3.34 kg (7 lb 5.8 oz) F Vag-Spont None N NAIN   4 Term 19 39w0d 15:01 / 00:31 3.29 kg (7 lb 4.1 oz) M Vag-Spont None N NAIN   3 Term 13 41w0d  3.685 kg (8 lb 2 oz) M Vag-Spont None N NAIN      Birth Comments: Pt states delivery wthout complications.   2 Term 07 38w0d  3.345 kg (7 lb 6 oz) M Vag-Spont None N NAIN      Birth Comments: Pt states no complications   1 Term 05 38w0d  3.345 kg (7 lb 6 oz) M Vag-Spont EPI N NAIN      Birth Comments: Pt states no complications     History reviewed. No pertinent surgical history.  Patient has no known allergies.    Patient Active Problem List   Diagnosis   • Supervision of normal pregnancy in third trimester   • Echogenic focus of heart of fetus affecting antepartum care of mother       Hospital Course:   35 y.o. , now para 5, was admitted with the above mentioned diagnosis, underwent vaginal delivery in car at [facility]. placenta delivered at [facility]. Pt gave birth to baby girl with APGARs of 8/8 and weight 3340g.  Patient's postpartum course was unremarkable, with progressive advancement in diet , ambulation and toleration of oral analgesia. Patient without complaints today and desires discharge.  For postpartum contraception, pt desires depo shot prior to discharge .    Physical Exam:  Temp:  [36.4 °C (97.5 °F)-37.1 °C (98.8 °F)] 36.7 °C (98 °F)  Pulse:  [80-86] 80  Resp:  [17-18]  18  BP: (100-131)/(59-71) 109/62  SpO2:  [95 %-97 %] 97 %  Physical Exam  General: well  Chest/Breasts: nipples intact   Abdomen: nontender, soft, non-distended  Fundus: firm, below umbilicus and nontender  Incision: not applicable, (vaginal delivery)  Perineum: deferred  Extremities: symmetric and no edema, calves nontender    Current Facility-Administered Medications   Medication Dose   • LR infusion     • PRN oxytocin (PITOCIN) (20 Units/1000 mL) PRN for excessive uterine bleeding - See Admin Instr  125-999 mL/hr   • docusate sodium (COLACE) capsule 100 mg  100 mg   • tetanus-dipth-acell pertussis (Tdap) inj 0.5 mL  0.5 mL   • measles, mumps and rubella vaccine (MMR) injection 0.5 mL  0.5 mL   • oxyCODONE-acetaminophen (PERCOCET) 5-325 MG per tablet 1 tablet  1 tablet   • ibuprofen (MOTRIN) tablet 600 mg  600 mg       Recent Labs     21  0622 21  1308   WBC 13.4* 13.0*   RBC 4.75 4.54   HEMOGLOBIN 13.7 13.2   HEMATOCRIT 40.5 38.7   MCV 85.3 85.2   MCH 28.8 29.1   MCHC 33.8 34.1   RDW 40.0 40.4   PLATELETCT 270 242   MPV 9.6 9.5       Activity:   Discharge to home  Pelvic Rest x 6 weeks  Call or come to ED for: heavy vaginal bleeding, fever >100.4, severe abdominal pain, severe headache, chest pain, shortness of breath,  N/V, incisional drainage, or other concerns.      Assessment:  normal postpartum course and good candidate for Depo-Provera injections     Follow up: [facility] or [facility] in 5 weeks for vaginal delivery; 1 week for incision check for  delivery.      Discharge Instructions:  Pelvic rest x 6 weeks  No heavy lifting until cleared by physician  Return to ED or come to the office for severe headache, shortness of breath, chest pain, heavy vaginal bleeding, incisional drainage, foul smelling vaginal discharge, or fever >100.4  - depo shot prior to discharge      Discharge Meds:   No current outpatient medications on file.

## 2021-04-29 ENCOUNTER — POST PARTUM (OUTPATIENT)
Dept: OBGYN | Facility: CLINIC | Age: 36
End: 2021-04-29
Payer: MEDICAID

## 2021-04-29 VITALS — DIASTOLIC BLOOD PRESSURE: 60 MMHG | WEIGHT: 149 LBS | BODY MASS INDEX: 30.09 KG/M2 | SYSTOLIC BLOOD PRESSURE: 108 MMHG

## 2021-04-29 PROBLEM — Z34.92 SUPERVISION OF NORMAL PREGNANCY IN SECOND TRIMESTER: Status: RESOLVED | Noted: 2020-09-11 | Resolved: 2021-04-29

## 2021-04-29 PROBLEM — O35.BXX0 ECHOGENIC FOCUS OF HEART OF FETUS AFFECTING ANTEPARTUM CARE OF MOTHER: Status: RESOLVED | Noted: 2020-11-13 | Resolved: 2021-04-29

## 2021-04-29 PROCEDURE — 0503F POSTPARTUM CARE VISIT: CPT | Performed by: NURSE PRACTITIONER

## 2021-04-29 ASSESSMENT — EDINBURGH POSTNATAL DEPRESSION SCALE (EPDS)
THE THOUGHT OF HARMING MYSELF HAS OCCURRED TO ME: NEVER
I HAVE BEEN ANXIOUS OR WORRIED FOR NO GOOD REASON: NO, NOT AT ALL
TOTAL SCORE: 3
I HAVE FELT SAD OR MISERABLE: NO, NOT AT ALL
I HAVE FELT SCARED OR PANICKY FOR NO GOOD REASON: NO, NOT AT ALL
I HAVE BEEN ABLE TO LAUGH AND SEE THE FUNNY SIDE OF THINGS: AS MUCH AS I ALWAYS COULD
I HAVE BEEN SO UNHAPPY THAT I HAVE HAD DIFFICULTY SLEEPING: NOT AT ALL
I HAVE BLAMED MYSELF UNNECESSARILY WHEN THINGS WENT WRONG: NO, NEVER
I HAVE BEEN SO UNHAPPY THAT I HAVE BEEN CRYING: ONLY OCCASIONALLY
I HAVE LOOKED FORWARD WITH ENJOYMENT TO THINGS: AS MUCH AS I EVER DID
THINGS HAVE BEEN GETTING ON TOP OF ME: YES, SOMETIMES I HAVEN'T BEEN COPING AS WELL AS USUAL

## 2021-04-29 NOTE — PROGRESS NOTES
Subjective:    Ana Hernandez is a 35 y.o.  female who presents for her postpartum exam. She had precipitous  in the parking lot. Her prenatal course was uncomplicated. She denies dysuria, vaginal bleeding, odor, itching or breast problems. She is bottlefeeding. She received depo-provera in the hospital prior to discharge for her birth control method.  just had vasectomy yesterday.  Reports no sex prior to this appointment.  Denies any S/S of PP depression.     Postpartum care            HPI  Review of Systems   All other systems reviewed and are negative.         Objective:     /60   Wt 67.6 kg (149 lb)   LMP 2020   BMI 30.09 kg/m²    EPDS: 3    Physical Exam  Vitals and nursing note reviewed.   Constitutional:       Appearance: Normal appearance. She is well-developed and normal weight.   HENT:      Head: Normocephalic and atraumatic.   Eyes:      Comments: Eye and ear exam deferred   Neck:      Thyroid: No thyromegaly.   Cardiovascular:      Rate and Rhythm: Normal rate and regular rhythm.      Heart sounds: Normal heart sounds.   Pulmonary:      Effort: Pulmonary effort is normal.      Breath sounds: Normal breath sounds.   Chest:      Comments: Deferred  Abdominal:      General: Abdomen is flat. Bowel sounds are normal.      Palpations: Abdomen is soft.   Genitourinary:     Exam position: Supine.      Comments: Deferred  Musculoskeletal:         General: Normal range of motion.      Cervical back: Normal range of motion and neck supple.   Skin:     General: Skin is warm and dry.      Capillary Refill: Capillary refill takes less than 2 seconds.   Neurological:      General: No focal deficit present.      Mental Status: She is alert and oriented to person, place, and time.      Deep Tendon Reflexes: Reflexes are normal and symmetric.   Psychiatric:         Mood and Affect: Mood normal.         Behavior: Behavior normal.         Thought Content: Thought content  normal.         Judgment: Judgment normal.               Assessment   Assessment:    1. PP care s/p   2. Exam WNL   3. Pap WNL     Patient Active Problem List    Diagnosis Date Noted   • Postpartum care following vaginal delivery 2021       Plan   Plan:    1. Encourage SBE.  2. Continue PNV.  3. Contraceptive counseling - follow up for contraceptive management.  Next depo due 6/10- if desired.  Card given.  4. Encouraged condom use.  5. Discussed diet, exercise and resumption of normal activities.  6. Gave copy of pap, f/u 3 yr.  7.  F/u c PCP or [facility] clinic as needed for primary care needs.

## 2021-04-29 NOTE — PATIENT INSTRUCTIONS
Plan   Plan:    1. Encourage SBE.  2. Continue PNV.  3. Contraceptive counseling - follow up for contraceptive management.  Next depo due 6/10-6/24 if desired.  Card given.  4. Encouraged condom use.  5. Discussed diet, exercise and resumption of normal activities.  6. Gave copy of pap, f/u 3 yr.  7.  F/u c PCP or [facility] clinic as needed for primary care needs.

## 2021-04-29 NOTE — PROGRESS NOTES
Pt here today for postpartum exam.  Delivery type: vaginal delivery on 3/24/21  Currently : bottle feeding  Desired BCM: got Depo provera after delivery.  will get vasectomy.   LMP: 4/27/21  Last pap: 1/6/2020=negative  Phone # [phone]

## 2025-07-18 ENCOUNTER — OFFICE VISIT (OUTPATIENT)
Dept: URGENT CARE | Facility: PHYSICIAN GROUP | Age: 40
End: 2025-07-18
Payer: COMMERCIAL

## 2025-07-18 VITALS
BODY MASS INDEX: 32 KG/M2 | HEIGHT: 59 IN | OXYGEN SATURATION: 100 % | HEART RATE: 92 BPM | WEIGHT: 158.73 LBS | RESPIRATION RATE: 14 BRPM | TEMPERATURE: 97.7 F | SYSTOLIC BLOOD PRESSURE: 114 MMHG | DIASTOLIC BLOOD PRESSURE: 70 MMHG

## 2025-07-18 DIAGNOSIS — R10.32 LEFT LOWER QUADRANT ABDOMINAL PAIN: Primary | ICD-10-CM

## 2025-07-18 LAB
APPEARANCE UR: CLEAR
BILIRUB UR STRIP-MCNC: NEGATIVE MG/DL
COLOR UR AUTO: NORMAL
GLUCOSE UR STRIP.AUTO-MCNC: NEGATIVE MG/DL
KETONES UR STRIP.AUTO-MCNC: NEGATIVE MG/DL
LEUKOCYTE ESTERASE UR QL STRIP.AUTO: NEGATIVE
NITRITE UR QL STRIP.AUTO: NEGATIVE
PH UR STRIP.AUTO: 7 [PH] (ref 5–8)
POCT INT CON NEG: NEGATIVE
POCT INT CON POS: POSITIVE
POCT URINE PREGNANCY TEST: NEGATIVE
PROT UR QL STRIP: NEGATIVE MG/DL
RBC UR QL AUTO: NEGATIVE
SP GR UR STRIP.AUTO: 1.01
UROBILINOGEN UR STRIP-MCNC: NORMAL MG/DL

## 2025-07-18 PROCEDURE — 99204 OFFICE O/P NEW MOD 45 MIN: CPT

## 2025-07-18 PROCEDURE — 81025 URINE PREGNANCY TEST: CPT

## 2025-07-18 PROCEDURE — 3078F DIAST BP <80 MM HG: CPT

## 2025-07-18 PROCEDURE — 81002 URINALYSIS NONAUTO W/O SCOPE: CPT

## 2025-07-18 PROCEDURE — 3074F SYST BP LT 130 MM HG: CPT

## 2025-07-18 ASSESSMENT — VISUAL ACUITY: OU: 1

## 2025-07-18 ASSESSMENT — ENCOUNTER SYMPTOMS
DIZZINESS: 0
BLOOD IN STOOL: 0
STRIDOR: 0
FEVER: 0
ABDOMINAL PAIN: 1
DIARRHEA: 0
BACK PAIN: 0
VOMITING: 0
COUGH: 0
MYALGIAS: 0
WEAKNESS: 0
SHORTNESS OF BREATH: 0
FLANK PAIN: 0
FOCAL WEAKNESS: 0
NAUSEA: 0
CONSTIPATION: 0
CHILLS: 0
NECK PAIN: 0

## 2025-07-19 NOTE — PROGRESS NOTES
"Subjective     Ana Hernandez is a 39 y.o. female who presents with LLQ Pain (X3days.)    HPI:   Ana is a 38yo female presenting for LLQ abdominal pain x3 days. Patient reports pain is constant, localized to the LLQ, is aching in nature, and is rated 6/10 in severity. Denies any history of abdominal illness or surgery. LBM today and formed, soft. Denies blood in stool. No heartburn or chest pain. Denies dysuria or hematuria. No flank pain, vomiting, fever, or shortness of breath. LMP 7/1/25. Denies diarrhea.      Review of Systems   Constitutional:  Negative for chills, fever and malaise/fatigue.   Respiratory:  Negative for cough, shortness of breath and stridor.    Gastrointestinal:  Positive for abdominal pain. Negative for blood in stool, constipation, diarrhea, melena, nausea and vomiting.   Genitourinary:  Negative for dysuria, flank pain, frequency, hematuria and urgency.   Musculoskeletal:  Negative for back pain, myalgias and neck pain.   Skin:  Negative for rash.   Neurological:  Negative for dizziness, focal weakness and weakness.     Past Medical History:  Diagnosis Date    Thyroid disease      History reviewed. No pertinent surgical history.     Patient has no known allergies.     Social History  Tobacco Use    Smoking status: Never    Smokeless tobacco: Never   Vaping Use    Vaping status: Never Used   Substance Use Topics    Alcohol use: Yes     Comment: Occassionally, last had a drink 10/26/2012    Drug use: No     Family History   Problem Relation Age of Onset    No Known Problems Mother     No Known Problems Father     No Known Problems Sister     No Known Problems Brother      Medications, Allergies, and current problem list reviewed today in Epic.      Objective     /70   Pulse 92   Temp 36.5 °C (97.7 °F)   Resp 14   Ht 1.499 m (4' 11\")   Wt 72 kg (158 lb 11.7 oz)   SpO2 100%   BMI 32.06 kg/m²      Physical Exam  Vitals reviewed.   Constitutional:       General: She is " not in acute distress.  HENT:      Nose: Nose normal.      Mouth/Throat:      Mouth: Mucous membranes are moist.      Pharynx: Uvula midline. No oropharyngeal exudate, posterior oropharyngeal erythema or uvula swelling.      Tonsils: No tonsillar abscesses.   Eyes:      General: Vision grossly intact. Gaze aligned appropriately. No visual field deficit.     Extraocular Movements: Extraocular movements intact.      Conjunctiva/sclera: Conjunctivae normal.      Pupils: Pupils are equal, round, and reactive to light.   Cardiovascular:      Rate and Rhythm: Normal rate and regular rhythm.      Pulses: Normal pulses.      Heart sounds: Normal heart sounds.   Pulmonary:      Effort: Pulmonary effort is normal. No tachypnea, accessory muscle usage, prolonged expiration, respiratory distress or retractions.      Breath sounds: Normal breath sounds. No stridor, decreased air movement or transmitted upper airway sounds. No wheezing, rhonchi or rales.   Abdominal:      General: Abdomen is flat. Bowel sounds are normal. There is no distension.      Palpations: Abdomen is soft. There is no mass.      Tenderness: There is abdominal tenderness in the left lower quadrant. There is no right CVA tenderness, left CVA tenderness, guarding or rebound.      Hernia: No hernia is present.   Musculoskeletal:         General: Normal range of motion.      Cervical back: Full passive range of motion without pain, normal range of motion and neck supple. No rigidity. Normal range of motion.   Skin:     General: Skin is warm and dry.      Capillary Refill: Capillary refill takes less than 2 seconds.   Neurological:      Mental Status: She is alert. Mental status is at baseline.      Sensory: Sensation is intact.      Motor: Motor function is intact.      Coordination: Coordination is intact.      Gait: Gait is intact.   Psychiatric:         Mood and Affect: Mood normal.         Behavior: Behavior normal.         Thought Content: Thought content  normal.       Results for orders placed or performed in visit on 07/18/25   POCT Urinalysis    Collection Time: 07/18/25  6:44 PM   Result Value Ref Range    POC Color Light yellow Negative    POC Appearance Clear Negative    POC Glucose Negative Negative mg/dL    POC Bilirubin Negative Negative mg/dL    POC Ketones Negative Negative mg/dL    POC Specific Gravity 1.015 <1.005 - >1.030    POC Blood Negative Negative    POC Urine PH 7.0 5.0 - 8.0    POC Protein Negative Negative mg/dL    POC Urobiligen 0.2 E.U./dL Negative (0.2) mg/dL    POC Nitrites Negative Negative    POC Leukocyte Esterase Negative Negative   POCT Pregnancy    Collection Time: 07/18/25  6:58 PM   Result Value Ref Range    POC Urine Pregnancy Test Negative     Internal Control Positive Positive     Internal Control Negative Negative      Assessment & Plan    1. Left lower quadrant abdominal pain (Primary)   - POCT Urinalysis  - POCT Pregnancy       MDM/Comments:   Patient presenting for LLQ abdominal pain. Significant tenderness to palpation of LLQ. Unable to obtain abdominal imaging at this time, warranting transfer to higher level of care for further evaluation.    Broad differential and needs a higher level of evaluation than what can reasonably be provided in urgent care setting, therefore sent to the emergency room. Patient understood and is in agreement with the plan of care. All questions were answered.       Differential diagnosis, natural history, supportive care, and indications for immediate follow-up discussed.          Disposition:     Higher level care via private car in stable condition         I personally reviewed prior external notes and test results pertinent to today's visit.  I have independently reviewed and interpreted all diagnostics ordered during this urgent care visit.                                            Electronically signed by EDUIN Cardoza